# Patient Record
Sex: FEMALE | Race: WHITE | NOT HISPANIC OR LATINO | Employment: OTHER | URBAN - METROPOLITAN AREA
[De-identification: names, ages, dates, MRNs, and addresses within clinical notes are randomized per-mention and may not be internally consistent; named-entity substitution may affect disease eponyms.]

---

## 2020-09-18 PROBLEM — M54.32 BILATERAL SCIATICA: Status: ACTIVE | Noted: 2017-12-08

## 2020-09-18 PROBLEM — R00.0 TACHYARRHYTHMIA: Status: ACTIVE | Noted: 2019-09-17

## 2020-09-18 PROBLEM — M54.31 BILATERAL SCIATICA: Status: ACTIVE | Noted: 2017-12-08

## 2020-09-18 PROBLEM — G47.00 INSOMNIA: Status: ACTIVE | Noted: 2018-12-13

## 2020-12-16 ENCOUNTER — LAB VISIT (OUTPATIENT)
Dept: LAB | Facility: HOSPITAL | Age: 70
End: 2020-12-16
Attending: INTERNAL MEDICINE
Payer: MEDICARE

## 2020-12-16 DIAGNOSIS — I10 ESSENTIAL (PRIMARY) HYPERTENSION: ICD-10-CM

## 2020-12-16 DIAGNOSIS — E78.2 MIXED HYPERLIPIDEMIA: ICD-10-CM

## 2020-12-16 LAB
ALBUMIN SERPL BCP-MCNC: 3.9 G/DL (ref 3.5–5.2)
ALP SERPL-CCNC: 64 U/L (ref 55–135)
ALT SERPL W/O P-5'-P-CCNC: 15 U/L (ref 10–44)
ANION GAP SERPL CALC-SCNC: 9 MMOL/L (ref 8–16)
AST SERPL-CCNC: 17 U/L (ref 10–40)
BASOPHILS # BLD AUTO: 0.05 K/UL (ref 0–0.2)
BASOPHILS NFR BLD: 1.2 % (ref 0–1.9)
BILIRUB SERPL-MCNC: 0.5 MG/DL (ref 0.1–1)
BUN SERPL-MCNC: 15 MG/DL (ref 8–23)
CALCIUM SERPL-MCNC: 9.2 MG/DL (ref 8.7–10.5)
CHLORIDE SERPL-SCNC: 107 MMOL/L (ref 95–110)
CHOLEST SERPL-MCNC: 180 MG/DL (ref 120–199)
CHOLEST/HDLC SERPL: 2.9 {RATIO} (ref 2–5)
CO2 SERPL-SCNC: 26 MMOL/L (ref 23–29)
CREAT SERPL-MCNC: 0.8 MG/DL (ref 0.5–1.4)
DIFFERENTIAL METHOD: ABNORMAL
EOSINOPHIL # BLD AUTO: 0.1 K/UL (ref 0–0.5)
EOSINOPHIL NFR BLD: 2.6 % (ref 0–8)
ERYTHROCYTE [DISTWIDTH] IN BLOOD BY AUTOMATED COUNT: 12.9 % (ref 11.5–14.5)
EST. GFR  (AFRICAN AMERICAN): >60 ML/MIN/1.73 M^2
EST. GFR  (NON AFRICAN AMERICAN): >60 ML/MIN/1.73 M^2
GLUCOSE SERPL-MCNC: 92 MG/DL (ref 70–110)
HCT VFR BLD AUTO: 37.9 % (ref 37–48.5)
HDLC SERPL-MCNC: 62 MG/DL (ref 40–75)
HDLC SERPL: 34.4 % (ref 20–50)
HGB BLD-MCNC: 12.4 G/DL (ref 12–16)
IMM GRANULOCYTES # BLD AUTO: 0.01 K/UL (ref 0–0.04)
IMM GRANULOCYTES NFR BLD AUTO: 0.2 % (ref 0–0.5)
LDLC SERPL CALC-MCNC: 100 MG/DL (ref 63–159)
LYMPHOCYTES # BLD AUTO: 1.7 K/UL (ref 1–4.8)
LYMPHOCYTES NFR BLD: 41.3 % (ref 18–48)
MCH RBC QN AUTO: 30.2 PG (ref 27–31)
MCHC RBC AUTO-ENTMCNC: 32.7 G/DL (ref 32–36)
MCV RBC AUTO: 92 FL (ref 82–98)
MONOCYTES # BLD AUTO: 0.6 K/UL (ref 0.3–1)
MONOCYTES NFR BLD: 13.9 % (ref 4–15)
NEUTROPHILS # BLD AUTO: 1.7 K/UL (ref 1.8–7.7)
NEUTROPHILS NFR BLD: 40.8 % (ref 38–73)
NONHDLC SERPL-MCNC: 118 MG/DL
NRBC BLD-RTO: 0 /100 WBC
PLATELET # BLD AUTO: 248 K/UL (ref 150–350)
PMV BLD AUTO: 9.7 FL (ref 9.2–12.9)
POTASSIUM SERPL-SCNC: 4.2 MMOL/L (ref 3.5–5.1)
PROT SERPL-MCNC: 6.8 G/DL (ref 6–8.4)
RBC # BLD AUTO: 4.1 M/UL (ref 4–5.4)
SODIUM SERPL-SCNC: 142 MMOL/L (ref 136–145)
TRIGL SERPL-MCNC: 90 MG/DL (ref 30–150)
WBC # BLD AUTO: 4.16 K/UL (ref 3.9–12.7)

## 2020-12-16 PROCEDURE — 80061 LIPID PANEL: CPT

## 2020-12-16 PROCEDURE — 36415 COLL VENOUS BLD VENIPUNCTURE: CPT

## 2020-12-16 PROCEDURE — 80053 COMPREHEN METABOLIC PANEL: CPT

## 2020-12-16 PROCEDURE — 85025 COMPLETE CBC W/AUTO DIFF WBC: CPT

## 2020-12-26 ENCOUNTER — OFFICE VISIT (OUTPATIENT)
Dept: URGENT CARE | Facility: CLINIC | Age: 70
End: 2020-12-26
Payer: MEDICARE

## 2020-12-26 VITALS
TEMPERATURE: 99 F | DIASTOLIC BLOOD PRESSURE: 97 MMHG | WEIGHT: 160 LBS | SYSTOLIC BLOOD PRESSURE: 149 MMHG | BODY MASS INDEX: 27.31 KG/M2 | HEIGHT: 64 IN | OXYGEN SATURATION: 98 % | HEART RATE: 85 BPM

## 2020-12-26 DIAGNOSIS — M79.641 RIGHT HAND PAIN: ICD-10-CM

## 2020-12-26 DIAGNOSIS — S63.601A SPRAIN OF RIGHT THUMB, UNSPECIFIED SITE OF DIGIT, INITIAL ENCOUNTER: Primary | ICD-10-CM

## 2020-12-26 PROCEDURE — 99204 PR OFFICE/OUTPT VISIT, NEW, LEVL IV, 45-59 MIN: ICD-10-PCS | Mod: S$GLB,,, | Performed by: NURSE PRACTITIONER

## 2020-12-26 PROCEDURE — 73130 X-RAY EXAM OF HAND: CPT | Mod: FY,RT,S$GLB, | Performed by: RADIOLOGY

## 2020-12-26 PROCEDURE — 73110 XR WRIST COMPLETE 3 VIEWS RIGHT: ICD-10-PCS | Mod: FY,RT,S$GLB, | Performed by: RADIOLOGY

## 2020-12-26 PROCEDURE — 73110 X-RAY EXAM OF WRIST: CPT | Mod: FY,RT,S$GLB, | Performed by: RADIOLOGY

## 2020-12-26 PROCEDURE — 3008F PR BODY MASS INDEX (BMI) DOCUMENTED: ICD-10-PCS | Mod: CPTII,S$GLB,, | Performed by: NURSE PRACTITIONER

## 2020-12-26 PROCEDURE — 99204 OFFICE O/P NEW MOD 45 MIN: CPT | Mod: S$GLB,,, | Performed by: NURSE PRACTITIONER

## 2020-12-26 PROCEDURE — 73130 XR HAND COMPLETE 3 VIEW RIGHT: ICD-10-PCS | Mod: FY,RT,S$GLB, | Performed by: RADIOLOGY

## 2020-12-26 PROCEDURE — 3008F BODY MASS INDEX DOCD: CPT | Mod: CPTII,S$GLB,, | Performed by: NURSE PRACTITIONER

## 2020-12-26 RX ORDER — IBUPROFEN 200 MG
600 TABLET ORAL
Status: COMPLETED | OUTPATIENT
Start: 2020-12-26 | End: 2020-12-26

## 2020-12-26 RX ADMIN — Medication 600 MG: at 01:12

## 2020-12-26 NOTE — PROGRESS NOTES
"Subjective:       Patient ID: Karen Arango is a 70 y.o. female.    Vitals:  height is 5' 4" (1.626 m) and weight is 72.6 kg (160 lb). Her temperature is 98.7 °F (37.1 °C). Her blood pressure is 149/97 (abnormal) and her pulse is 85. Her oxygen saturation is 98%.     Chief Complaint: Hand Injury (THUMB PAIN SWOLLEN)    70-year-old female with complaints of right hand pain/thumb pain after hitting it on the floor while playing with her grandchild yesterday. " I think I jammed it".      Hand Injury   Her dominant hand is their right hand. The incident occurred 12 to 24 hours ago (YESTERDAY). The incident occurred at home. Injury mechanism: SLID AND JAMMED FINGER. The pain is present in the right fingers. The quality of the pain is described as aching (THROBBING). The pain does not radiate. The pain is at a severity of 7/10. The pain is moderate. The pain has been constant since the incident. Pertinent negatives include no chest pain. The symptoms are aggravated by palpation and movement. She has tried nothing for the symptoms. The treatment provided no relief.       Constitution: Negative. Negative for chills, fatigue and fever.   HENT: Negative for congestion and sore throat.    Neck: Negative for painful lymph nodes.   Cardiovascular: Negative for chest pain and leg swelling.   Eyes: Negative for double vision and blurred vision.   Respiratory: Negative for cough and shortness of breath.    Gastrointestinal: Negative for nausea, vomiting and diarrhea.   Genitourinary: Negative for dysuria, frequency, urgency and history of kidney stones.   Musculoskeletal: Positive for joint pain (right thumb) and joint swelling (mild swelling). Negative for muscle cramps and muscle ache.   Skin: Negative for color change, pale, rash and bruising.   Allergic/Immunologic: Negative for seasonal allergies.   Neurological: Negative for dizziness, history of vertigo, light-headedness, passing out and headaches.   Hematologic/Lymphatic: " Negative for swollen lymph nodes.   Psychiatric/Behavioral: Negative for nervous/anxious, sleep disturbance and depression. The patient is not nervous/anxious.        Objective:      Physical Exam   Musculoskeletal:      Right hand: She exhibits normal capillary refill. Decreased sensation is not present in the ulnar distribution, is not present in the medial distribution and is not present in the radial distribution.        Hands:      The following results have been reviewed with the patient:  LABS-     IMAGING-  Xr Wrist Complete 3 Views Right    Result Date: 12/26/2020  EXAMINATION: XR WRIST COMPLETE 3 VIEWS RIGHT CLINICAL HISTORY: Pain in right hand TECHNIQUE: PA, lateral, and oblique views of the right wrist were performed. FINDINGS: There is no fracture, dislocation, or bony erosion.     As above. Electronically signed by: Dann Zelaya MD Date:    12/26/2020 Time:    13:14    Xr Hand Complete 3 View Right    Result Date: 12/26/2020  EXAMINATION: XR HAND COMPLETE 3 VIEW RIGHT CLINICAL HISTORY: Pain in right hand TECHNIQUE: PA, lateral, and oblique views of the right hand were performed. FINDINGS: There is no acute fracture, dislocation, or bony erosion.  There is loss of joint space involving the proximal interphalangeal joint of the 4th digit which appears mildly laterally subluxed.     As above. Electronically signed by: Dann Zelaya MD Date:    12/26/2020 Time:    13:13        Assessment:       1. Sprain of right thumb, unspecified site of digit, initial encounter    2. Right hand pain        Plan:     FOLLOWUP  No follow-ups on file.     PATIENT INSTRUCTIONS  Patient Instructions     Rest, ice, elevation.  Follow up with ortho as discussed.  Wear splint as needed.    Finger Sprain  A sprain is a stretching or tearing of the ligaments that hold a joint together. There are no broken bones. Sprains take 3 to 6 weeks to heal.  A sprained finger may be treated with a splint or buddy tape. This is when you tape  the injured finger to the one next to it for support. Minor sprains may require no additional support.  Home care  · Keep your hand elevated to reduce pain and swelling. This is very important during the first 48 hours.  · Apply an ice pack over the injured area for 15 to 20 minutes every 3 to 6 hours. You should do this for the first 24 to 48 hours. You can make an ice pack by filling a plastic bag that seals at the top with ice cubes and then wrapping it with a thin towel. Continue the use of ice packs for relief of pain and swelling as needed. As the ice melts, be careful to avoid getting any wrap or splint wet. After 48 hours, apply heat (warm shower or warm bath) for 15 to 20 minutes several times a day, or alternate ice and heat.  · If buddy tape was applied and it becomes wet or dirty, change it. You may replace it with paper, plastic or cloth tape. Cloth tape and paper tapes must be kept dry. Apply gauze or cotton padding between the fingers, especially at the webbed space. This will help prevent the skin from getting moist and breaking down. Keep the buddy tape in place for at least 4 weeks, or as instructed by your healthcare provider.  · If a splint was applied, wear it for the time advised.  · You may use over-the-counter pain medicine to control pain, unless another pain medicine was prescribed. If you have chronic liver or kidney disease or ever had a stomach ulcer or GI bleeding, talk with your healthcare provider before using these medicines.  Follow-up care  Follow up with your healthcare provider as directed. Finger joints will become stiff if immobile for too long. If a splint was applied, ask your healthcare provider when it is safe to begin range-of-motion exercises.  Sometimes fractures dont show up on the first X-ray. Bruises and sprains can sometimes hurt as much as a fracture. These injuries can take time to heal completely. If your symptoms dont improve or they get worse, talk with your  healthcare provider. You may need a repeat X-ray. If X-rays were taken, you will be told of any new findings that may affect your care.  When to seek medical advice  Call your healthcare provider right away if any of these occur:  · Pain or swelling increases  · Fingers or hand becomes cold, blue, numb, or tingly  Date Last Reviewed: 11/20/2015  © 2041-0133 [employer]. [address] [place] [address], [place] [address]. All rights reserved. This information is not intended as a substitute for professional medical care. Always follow your healthcare professional's instructions.             THANK YOU FOR ALLOWING ME TO PARTICIPATE IN YOUR HEALTHCARE,     Enrico Currie NP     Sprain of right thumb, unspecified site of digit, initial encounter  -     Ambulatory referral/consult to Orthopedics    Right hand pain  -     XR HAND COMPLETE 3 VIEW RIGHT; Future; Expected date: 12/26/2020  -     XR WRIST COMPLETE 3 VIEWS RIGHT; Future; Expected date: 12/26/2020  -     ibuprofen tablet 600 mg  -     WRIST BRACE FOR HOME USE

## 2020-12-26 NOTE — PATIENT INSTRUCTIONS
Rest, ice, elevation.  Follow up with ortho as discussed.  Wear splint as needed.    Finger Sprain  A sprain is a stretching or tearing of the ligaments that hold a joint together. There are no broken bones. Sprains take 3 to 6 weeks to heal.  A sprained finger may be treated with a splint or buddy tape. This is when you tape the injured finger to the one next to it for support. Minor sprains may require no additional support.  Home care  · Keep your hand elevated to reduce pain and swelling. This is very important during the first 48 hours.  · Apply an ice pack over the injured area for 15 to 20 minutes every 3 to 6 hours. You should do this for the first 24 to 48 hours. You can make an ice pack by filling a plastic bag that seals at the top with ice cubes and then wrapping it with a thin towel. Continue the use of ice packs for relief of pain and swelling as needed. As the ice melts, be careful to avoid getting any wrap or splint wet. After 48 hours, apply heat (warm shower or warm bath) for 15 to 20 minutes several times a day, or alternate ice and heat.  · If buddy tape was applied and it becomes wet or dirty, change it. You may replace it with paper, plastic or cloth tape. Cloth tape and paper tapes must be kept dry. Apply gauze or cotton padding between the fingers, especially at the webbed space. This will help prevent the skin from getting moist and breaking down. Keep the buddy tape in place for at least 4 weeks, or as instructed by your healthcare provider.  · If a splint was applied, wear it for the time advised.  · You may use over-the-counter pain medicine to control pain, unless another pain medicine was prescribed. If you have chronic liver or kidney disease or ever had a stomach ulcer or GI bleeding, talk with your healthcare provider before using these medicines.  Follow-up care  Follow up with your healthcare provider as directed. Finger joints will become stiff if immobile for too long. If a  splint was applied, ask your healthcare provider when it is safe to begin range-of-motion exercises.  Sometimes fractures dont show up on the first X-ray. Bruises and sprains can sometimes hurt as much as a fracture. These injuries can take time to heal completely. If your symptoms dont improve or they get worse, talk with your healthcare provider. You may need a repeat X-ray. If X-rays were taken, you will be told of any new findings that may affect your care.  When to seek medical advice  Call your healthcare provider right away if any of these occur:  · Pain or swelling increases  · Fingers or hand becomes cold, blue, numb, or tingly  Date Last Reviewed: 11/20/2015  © 2390-7459 [employer]. [address] [place] [address], [place] [address]. All rights reserved. This information is not intended as a substitute for professional medical care. Always follow your healthcare professional's instructions.

## 2020-12-28 ENCOUNTER — TELEPHONE (OUTPATIENT)
Dept: URGENT CARE | Facility: CLINIC | Age: 70
End: 2020-12-28

## 2021-03-09 ENCOUNTER — LAB VISIT (OUTPATIENT)
Dept: LAB | Facility: HOSPITAL | Age: 71
End: 2021-03-09
Attending: INTERNAL MEDICINE
Payer: MEDICARE

## 2021-03-09 DIAGNOSIS — M81.0 OSTEOPOROSIS: Primary | ICD-10-CM

## 2021-03-09 DIAGNOSIS — E55.9 VITAMIN D DEFICIENCY, UNSPECIFIED: ICD-10-CM

## 2021-03-09 LAB
25(OH)D3+25(OH)D2 SERPL-MCNC: 56 NG/ML (ref 30–96)
ALBUMIN SERPL BCP-MCNC: 4 G/DL (ref 3.5–5.2)
ALP SERPL-CCNC: 66 U/L (ref 55–135)
ALT SERPL W/O P-5'-P-CCNC: 16 U/L (ref 10–44)
ANION GAP SERPL CALC-SCNC: 8 MMOL/L (ref 8–16)
AST SERPL-CCNC: 16 U/L (ref 10–40)
BASOPHILS # BLD AUTO: 0.04 K/UL (ref 0–0.2)
BASOPHILS NFR BLD: 1.1 % (ref 0–1.9)
BILIRUB SERPL-MCNC: 0.5 MG/DL (ref 0.1–1)
BUN SERPL-MCNC: 12 MG/DL (ref 8–23)
CALCIUM SERPL-MCNC: 9.2 MG/DL (ref 8.7–10.5)
CHLORIDE SERPL-SCNC: 106 MMOL/L (ref 95–110)
CO2 SERPL-SCNC: 29 MMOL/L (ref 23–29)
CREAT SERPL-MCNC: 0.8 MG/DL (ref 0.5–1.4)
DIFFERENTIAL METHOD: ABNORMAL
EOSINOPHIL # BLD AUTO: 0.1 K/UL (ref 0–0.5)
EOSINOPHIL NFR BLD: 2.5 % (ref 0–8)
ERYTHROCYTE [DISTWIDTH] IN BLOOD BY AUTOMATED COUNT: 12.6 % (ref 11.5–14.5)
EST. GFR  (AFRICAN AMERICAN): >60 ML/MIN/1.73 M^2
EST. GFR  (NON AFRICAN AMERICAN): >60 ML/MIN/1.73 M^2
GLUCOSE SERPL-MCNC: 109 MG/DL (ref 70–110)
HCT VFR BLD AUTO: 39.1 % (ref 37–48.5)
HGB BLD-MCNC: 12.7 G/DL (ref 12–16)
IMM GRANULOCYTES # BLD AUTO: 0.01 K/UL (ref 0–0.04)
IMM GRANULOCYTES NFR BLD AUTO: 0.3 % (ref 0–0.5)
LYMPHOCYTES # BLD AUTO: 1.4 K/UL (ref 1–4.8)
LYMPHOCYTES NFR BLD: 38.5 % (ref 18–48)
MAGNESIUM SERPL-MCNC: 2.1 MG/DL (ref 1.6–2.6)
MCH RBC QN AUTO: 30.2 PG (ref 27–31)
MCHC RBC AUTO-ENTMCNC: 32.5 G/DL (ref 32–36)
MCV RBC AUTO: 93 FL (ref 82–98)
MONOCYTES # BLD AUTO: 0.5 K/UL (ref 0.3–1)
MONOCYTES NFR BLD: 14.8 % (ref 4–15)
NEUTROPHILS # BLD AUTO: 1.6 K/UL (ref 1.8–7.7)
NEUTROPHILS NFR BLD: 42.8 % (ref 38–73)
NRBC BLD-RTO: 0 /100 WBC
PHOSPHATE SERPL-MCNC: 3.2 MG/DL (ref 2.7–4.5)
PLATELET # BLD AUTO: 230 K/UL (ref 150–350)
PMV BLD AUTO: 9.6 FL (ref 9.2–12.9)
POTASSIUM SERPL-SCNC: 3.9 MMOL/L (ref 3.5–5.1)
PROT SERPL-MCNC: 7 G/DL (ref 6–8.4)
PTH-INTACT SERPL-MCNC: 46 PG/ML (ref 9–77)
RBC # BLD AUTO: 4.21 M/UL (ref 4–5.4)
SODIUM SERPL-SCNC: 143 MMOL/L (ref 136–145)
URATE SERPL-MCNC: 5.5 MG/DL (ref 2.4–5.7)
WBC # BLD AUTO: 3.66 K/UL (ref 3.9–12.7)

## 2021-03-09 PROCEDURE — 84100 ASSAY OF PHOSPHORUS: CPT | Performed by: INTERNAL MEDICINE

## 2021-03-09 PROCEDURE — 84550 ASSAY OF BLOOD/URIC ACID: CPT | Performed by: INTERNAL MEDICINE

## 2021-03-09 PROCEDURE — 36415 COLL VENOUS BLD VENIPUNCTURE: CPT | Performed by: INTERNAL MEDICINE

## 2021-03-09 PROCEDURE — 83519 RIA NONANTIBODY: CPT | Performed by: INTERNAL MEDICINE

## 2021-03-09 PROCEDURE — 80053 COMPREHEN METABOLIC PANEL: CPT | Performed by: INTERNAL MEDICINE

## 2021-03-09 PROCEDURE — 82523 COLLAGEN CROSSLINKS: CPT | Performed by: INTERNAL MEDICINE

## 2021-03-09 PROCEDURE — 83735 ASSAY OF MAGNESIUM: CPT | Performed by: INTERNAL MEDICINE

## 2021-03-09 PROCEDURE — 85025 COMPLETE CBC W/AUTO DIFF WBC: CPT | Performed by: INTERNAL MEDICINE

## 2021-03-09 PROCEDURE — 82306 VITAMIN D 25 HYDROXY: CPT | Performed by: INTERNAL MEDICINE

## 2021-03-09 PROCEDURE — 83970 ASSAY OF PARATHORMONE: CPT | Performed by: INTERNAL MEDICINE

## 2021-03-10 LAB — COLLAGEN CTX SERPL-MCNC: 319 PG/ML

## 2021-03-11 LAB — PROCOLLAGEN TYPE 1 PROPEPTIDE: 49 MCG/L

## 2021-09-08 ENCOUNTER — TELEPHONE (OUTPATIENT)
Dept: FAMILY MEDICINE | Facility: CLINIC | Age: 71
End: 2021-09-08

## 2021-09-08 DIAGNOSIS — U07.1 COVID-19 VIRUS INFECTION: Primary | ICD-10-CM

## 2021-09-09 ENCOUNTER — INFUSION (OUTPATIENT)
Dept: INFECTIOUS DISEASES | Facility: HOSPITAL | Age: 71
End: 2021-09-09
Attending: INTERNAL MEDICINE
Payer: MEDICARE

## 2021-09-09 ENCOUNTER — NURSE TRIAGE (OUTPATIENT)
Dept: ADMINISTRATIVE | Facility: CLINIC | Age: 71
End: 2021-09-09

## 2021-09-09 VITALS
BODY MASS INDEX: 28.35 KG/M2 | HEIGHT: 63 IN | WEIGHT: 160 LBS | HEART RATE: 67 BPM | RESPIRATION RATE: 20 BRPM | OXYGEN SATURATION: 97 % | SYSTOLIC BLOOD PRESSURE: 143 MMHG | DIASTOLIC BLOOD PRESSURE: 77 MMHG | TEMPERATURE: 99 F

## 2021-09-09 DIAGNOSIS — U07.1 COVID-19: Primary | ICD-10-CM

## 2021-09-09 PROCEDURE — 25000003 PHARM REV CODE 250: Performed by: INTERNAL MEDICINE

## 2021-09-09 PROCEDURE — 63600175 PHARM REV CODE 636 W HCPCS: Performed by: INTERNAL MEDICINE

## 2021-09-09 PROCEDURE — M0243 CASIRIVI AND IMDEVI INFUSION: HCPCS | Performed by: INTERNAL MEDICINE

## 2021-09-09 RX ORDER — EPINEPHRINE 0.3 MG/.3ML
0.3 INJECTION SUBCUTANEOUS
Status: DISCONTINUED | OUTPATIENT
Start: 2021-09-09 | End: 2021-12-09

## 2021-09-09 RX ORDER — SODIUM CHLORIDE 0.9 % (FLUSH) 0.9 %
10 SYRINGE (ML) INJECTION
Status: DISCONTINUED | OUTPATIENT
Start: 2021-09-09 | End: 2021-12-09

## 2021-09-09 RX ORDER — DIPHENHYDRAMINE HYDROCHLORIDE 50 MG/ML
25 INJECTION INTRAMUSCULAR; INTRAVENOUS ONCE AS NEEDED
Status: DISCONTINUED | OUTPATIENT
Start: 2021-09-09 | End: 2021-12-09

## 2021-09-09 RX ORDER — ONDANSETRON 4 MG/1
4 TABLET, ORALLY DISINTEGRATING ORAL ONCE AS NEEDED
Status: DISCONTINUED | OUTPATIENT
Start: 2021-09-09 | End: 2021-12-09

## 2021-09-09 RX ORDER — ACETAMINOPHEN 325 MG/1
650 TABLET ORAL ONCE AS NEEDED
Status: DISCONTINUED | OUTPATIENT
Start: 2021-09-09 | End: 2021-12-09

## 2021-09-09 RX ORDER — ALBUTEROL SULFATE 90 UG/1
2 AEROSOL, METERED RESPIRATORY (INHALATION)
Status: DISCONTINUED | OUTPATIENT
Start: 2021-09-09 | End: 2021-12-09

## 2021-09-09 RX ADMIN — CASIRIVIMAB AND IMDEVIMAB 600 MG: 600; 600 INJECTION, SOLUTION, CONCENTRATE INTRAVENOUS at 01:09

## 2021-09-10 PROBLEM — F43.20 ADJUSTMENT DISORDER: Status: ACTIVE | Noted: 2021-03-22

## 2021-12-07 ENCOUNTER — LAB VISIT (OUTPATIENT)
Dept: LAB | Facility: HOSPITAL | Age: 71
End: 2021-12-07
Attending: INTERNAL MEDICINE
Payer: MEDICARE

## 2021-12-07 DIAGNOSIS — I10 ESSENTIAL (PRIMARY) HYPERTENSION: ICD-10-CM

## 2021-12-07 LAB
ALBUMIN SERPL BCP-MCNC: 3.8 G/DL (ref 3.5–5.2)
ALP SERPL-CCNC: 65 U/L (ref 55–135)
ALT SERPL W/O P-5'-P-CCNC: 18 U/L (ref 10–44)
ANION GAP SERPL CALC-SCNC: 8 MMOL/L (ref 8–16)
AST SERPL-CCNC: 17 U/L (ref 10–40)
BASOPHILS # BLD AUTO: 0.04 K/UL (ref 0–0.2)
BASOPHILS NFR BLD: 1.1 % (ref 0–1.9)
BILIRUB SERPL-MCNC: 0.5 MG/DL (ref 0.1–1)
BUN SERPL-MCNC: 12 MG/DL (ref 8–23)
CALCIUM SERPL-MCNC: 9.2 MG/DL (ref 8.7–10.5)
CHLORIDE SERPL-SCNC: 108 MMOL/L (ref 95–110)
CHOLEST SERPL-MCNC: 204 MG/DL (ref 120–199)
CHOLEST/HDLC SERPL: 3.3 {RATIO} (ref 2–5)
CO2 SERPL-SCNC: 28 MMOL/L (ref 23–29)
CREAT SERPL-MCNC: 0.8 MG/DL (ref 0.5–1.4)
DIFFERENTIAL METHOD: ABNORMAL
EOSINOPHIL # BLD AUTO: 0.1 K/UL (ref 0–0.5)
EOSINOPHIL NFR BLD: 2.5 % (ref 0–8)
ERYTHROCYTE [DISTWIDTH] IN BLOOD BY AUTOMATED COUNT: 13 % (ref 11.5–14.5)
EST. GFR  (AFRICAN AMERICAN): >60 ML/MIN/1.73 M^2
EST. GFR  (NON AFRICAN AMERICAN): >60 ML/MIN/1.73 M^2
GLUCOSE SERPL-MCNC: 104 MG/DL (ref 70–110)
HCT VFR BLD AUTO: 38.9 % (ref 37–48.5)
HDLC SERPL-MCNC: 61 MG/DL (ref 40–75)
HDLC SERPL: 29.9 % (ref 20–50)
HGB BLD-MCNC: 12.8 G/DL (ref 12–16)
IMM GRANULOCYTES # BLD AUTO: 0 K/UL (ref 0–0.04)
IMM GRANULOCYTES NFR BLD AUTO: 0 % (ref 0–0.5)
LDLC SERPL CALC-MCNC: 118.2 MG/DL (ref 63–159)
LYMPHOCYTES # BLD AUTO: 1.5 K/UL (ref 1–4.8)
LYMPHOCYTES NFR BLD: 42.2 % (ref 18–48)
MCH RBC QN AUTO: 30.3 PG (ref 27–31)
MCHC RBC AUTO-ENTMCNC: 32.9 G/DL (ref 32–36)
MCV RBC AUTO: 92 FL (ref 82–98)
MONOCYTES # BLD AUTO: 0.5 K/UL (ref 0.3–1)
MONOCYTES NFR BLD: 13.4 % (ref 4–15)
NEUTROPHILS # BLD AUTO: 1.5 K/UL (ref 1.8–7.7)
NEUTROPHILS NFR BLD: 40.8 % (ref 38–73)
NONHDLC SERPL-MCNC: 143 MG/DL
NRBC BLD-RTO: 0 /100 WBC
PLATELET # BLD AUTO: 220 K/UL (ref 150–450)
PMV BLD AUTO: 9.8 FL (ref 9.2–12.9)
POTASSIUM SERPL-SCNC: 4.3 MMOL/L (ref 3.5–5.1)
PROT SERPL-MCNC: 6.4 G/DL (ref 6–8.4)
RBC # BLD AUTO: 4.22 M/UL (ref 4–5.4)
SODIUM SERPL-SCNC: 144 MMOL/L (ref 136–145)
TRIGL SERPL-MCNC: 124 MG/DL (ref 30–150)
WBC # BLD AUTO: 3.65 K/UL (ref 3.9–12.7)

## 2021-12-07 PROCEDURE — 80061 LIPID PANEL: CPT | Performed by: INTERNAL MEDICINE

## 2021-12-07 PROCEDURE — 36415 COLL VENOUS BLD VENIPUNCTURE: CPT | Performed by: INTERNAL MEDICINE

## 2021-12-07 PROCEDURE — 80053 COMPREHEN METABOLIC PANEL: CPT | Performed by: INTERNAL MEDICINE

## 2021-12-07 PROCEDURE — 85025 COMPLETE CBC W/AUTO DIFF WBC: CPT | Performed by: INTERNAL MEDICINE

## 2021-12-09 PROBLEM — R00.0 TACHYARRHYTHMIA: Status: RESOLVED | Noted: 2019-09-17 | Resolved: 2021-12-09

## 2021-12-27 ENCOUNTER — TELEPHONE (OUTPATIENT)
Dept: OTOLARYNGOLOGY | Facility: CLINIC | Age: 71
End: 2021-12-27
Payer: MEDICARE

## 2022-02-16 ENCOUNTER — OFFICE VISIT (OUTPATIENT)
Dept: OTOLARYNGOLOGY | Facility: CLINIC | Age: 72
End: 2022-02-16
Payer: MEDICARE

## 2022-02-16 VITALS
HEIGHT: 63 IN | BODY MASS INDEX: 29.92 KG/M2 | SYSTOLIC BLOOD PRESSURE: 140 MMHG | WEIGHT: 168.88 LBS | HEART RATE: 79 BPM | DIASTOLIC BLOOD PRESSURE: 90 MMHG

## 2022-02-16 DIAGNOSIS — J34.89 RHINORRHEA: ICD-10-CM

## 2022-02-16 DIAGNOSIS — J30.89 NON-SEASONAL ALLERGIC RHINITIS DUE TO OTHER ALLERGIC TRIGGER: Chronic | ICD-10-CM

## 2022-02-16 DIAGNOSIS — J30.1 SEASONAL ALLERGIC RHINITIS DUE TO POLLEN: Primary | Chronic | ICD-10-CM

## 2022-02-16 PROCEDURE — 1101F PR PT FALLS ASSESS DOC 0-1 FALLS W/OUT INJ PAST YR: ICD-10-PCS | Mod: CPTII,S$GLB,, | Performed by: OTOLARYNGOLOGY

## 2022-02-16 PROCEDURE — 99999 PR PBB SHADOW E&M-EST. PATIENT-LVL III: CPT | Mod: PBBFAC,,, | Performed by: OTOLARYNGOLOGY

## 2022-02-16 PROCEDURE — 1159F PR MEDICATION LIST DOCUMENTED IN MEDICAL RECORD: ICD-10-PCS | Mod: CPTII,S$GLB,, | Performed by: OTOLARYNGOLOGY

## 2022-02-16 PROCEDURE — 1126F AMNT PAIN NOTED NONE PRSNT: CPT | Mod: CPTII,S$GLB,, | Performed by: OTOLARYNGOLOGY

## 2022-02-16 PROCEDURE — 3077F SYST BP >= 140 MM HG: CPT | Mod: CPTII,S$GLB,, | Performed by: OTOLARYNGOLOGY

## 2022-02-16 PROCEDURE — 3008F PR BODY MASS INDEX (BMI) DOCUMENTED: ICD-10-PCS | Mod: CPTII,S$GLB,, | Performed by: OTOLARYNGOLOGY

## 2022-02-16 PROCEDURE — 1160F RVW MEDS BY RX/DR IN RCRD: CPT | Mod: CPTII,S$GLB,, | Performed by: OTOLARYNGOLOGY

## 2022-02-16 PROCEDURE — 3080F PR MOST RECENT DIASTOLIC BLOOD PRESSURE >= 90 MM HG: ICD-10-PCS | Mod: CPTII,S$GLB,, | Performed by: OTOLARYNGOLOGY

## 2022-02-16 PROCEDURE — 99214 PR OFFICE/OUTPT VISIT, EST, LEVL IV, 30-39 MIN: ICD-10-PCS | Mod: S$GLB,,, | Performed by: OTOLARYNGOLOGY

## 2022-02-16 PROCEDURE — 99214 OFFICE O/P EST MOD 30 MIN: CPT | Mod: S$GLB,,, | Performed by: OTOLARYNGOLOGY

## 2022-02-16 PROCEDURE — 3080F DIAST BP >= 90 MM HG: CPT | Mod: CPTII,S$GLB,, | Performed by: OTOLARYNGOLOGY

## 2022-02-16 PROCEDURE — 1126F PR PAIN SEVERITY QUANTIFIED, NO PAIN PRESENT: ICD-10-PCS | Mod: CPTII,S$GLB,, | Performed by: OTOLARYNGOLOGY

## 2022-02-16 PROCEDURE — 3288F PR FALLS RISK ASSESSMENT DOCUMENTED: ICD-10-PCS | Mod: CPTII,S$GLB,, | Performed by: OTOLARYNGOLOGY

## 2022-02-16 PROCEDURE — 1160F PR REVIEW ALL MEDS BY PRESCRIBER/CLIN PHARMACIST DOCUMENTED: ICD-10-PCS | Mod: CPTII,S$GLB,, | Performed by: OTOLARYNGOLOGY

## 2022-02-16 PROCEDURE — 3077F PR MOST RECENT SYSTOLIC BLOOD PRESSURE >= 140 MM HG: ICD-10-PCS | Mod: CPTII,S$GLB,, | Performed by: OTOLARYNGOLOGY

## 2022-02-16 PROCEDURE — 3008F BODY MASS INDEX DOCD: CPT | Mod: CPTII,S$GLB,, | Performed by: OTOLARYNGOLOGY

## 2022-02-16 PROCEDURE — 1159F MED LIST DOCD IN RCRD: CPT | Mod: CPTII,S$GLB,, | Performed by: OTOLARYNGOLOGY

## 2022-02-16 PROCEDURE — 99999 PR PBB SHADOW E&M-EST. PATIENT-LVL III: ICD-10-PCS | Mod: PBBFAC,,, | Performed by: OTOLARYNGOLOGY

## 2022-02-16 PROCEDURE — 1101F PT FALLS ASSESS-DOCD LE1/YR: CPT | Mod: CPTII,S$GLB,, | Performed by: OTOLARYNGOLOGY

## 2022-02-16 PROCEDURE — 3288F FALL RISK ASSESSMENT DOCD: CPT | Mod: CPTII,S$GLB,, | Performed by: OTOLARYNGOLOGY

## 2022-02-16 RX ORDER — FLUTICASONE PROPIONATE 50 MCG
1 SPRAY, SUSPENSION (ML) NASAL 2 TIMES DAILY
Qty: 11.1 ML | Refills: 11 | Status: SHIPPED | OUTPATIENT
Start: 2022-02-16 | End: 2022-03-18

## 2022-02-16 RX ORDER — AZELASTINE 1 MG/ML
1 SPRAY, METERED NASAL 2 TIMES DAILY
Qty: 30 ML | Refills: 11 | Status: SHIPPED | OUTPATIENT
Start: 2022-02-16 | End: 2023-03-03 | Stop reason: SDUPTHER

## 2022-02-16 RX ORDER — LOTEPREDNOL ETABONATE 3.8 MG/G
1 GEL OPHTHALMIC 3 TIMES DAILY
COMMUNITY
Start: 2021-09-20

## 2022-02-16 NOTE — PATIENT INSTRUCTIONS
The patient was given a prescription for a nasal steroid and/or nasal antihistamine nasal spray and we discussed in detail the proper mechanism of use directing the spray away from the nasal septum.  The patient was also instructed to take a daily oral antihistamine (Claritin, Zyrtec, Allegra, or Xyzal).    In addition, we also discussed that it will take 3-4 weeks of daily use to achieve maximal effectiveness.  The patient will please call in 3-4 weeks with their progress.  If allergy symptoms persist at that time, we could consider additional medications and possibly allergy testing.    How do you use a Nasal Corticosteroid Spray?    Make sure you understand your dosing instructions. Spray only the number of prescribed sprays in each nostril. Read the package instructions before using your spray the first time.    Most corticosteroid sprays suggest the following steps:    Wash your hands well.    Gently blow your nose to clear the passageway.    Shake the container several times.    Tilt your head slightly downward.  Use the opposite hand from the nostril you will be spraying to hold the spray bottle.    Block one nostril with your finger.  Insert the nasal applicator into the other nostril.    Aim the spray toward the outer wall of the nostril.  Inhale slowly through the nose and press the .    Breathe out and repeat to apply the prescribed number of sprays.  Repeat these steps for the other nostril.     Avoid sneezing or blowing your nose right after spraying.

## 2022-02-16 NOTE — PROGRESS NOTES
Chief Complaint   Patient presents with    Allergies        HPI: Karen Arango is here for evaluation of possible allergic rhinitis. Patient's symptoms include clear rhinorrhea, nasal congestion, postnasal drip, pressure sensation in ears, sinus pressure, wheezing and pulmonary symptoms when she has allergy flares. These symptoms are perennial with seasonal exacerbation. Current triggers include exposure to pollens and fumes/strong odors. The patient has been suffering from these symptoms for approximately many years. The patient has tried over the counter medications and prescription nasal sprays with good relief of symptoms. Immunotherapy has been used with good relief of symptoms. The patient has never had nasal polyps. The patient has a history of asthma. The patient does not suffer from frequent sinopulmonary infections. The patient has not had sinus surgery in the past. The patient has no history of eczema.  The she uses nasal saline and takes Zyrtec p.r.n..  She has not been using any allergy medications consistently, but reports that she has not had any recent flare ups with the allergies.  With previous allergy testing she notes that she had sensitivities to grass, trees, auris root/perfumes, and cigarette smoke.  She does not recall other sensitivities besides those.  She was a former patient of angel and Dr. Felix at my old practice.    Past Medical History:   Diagnosis Date    Essential (primary) hypertension 10/27/2015    Gastro-esophageal reflux disease without esophagitis 10/27/2015    Mixed hyperlipidemia 10/27/2015    Tachyarrhythmia 9/17/2019     Social History     Socioeconomic History    Marital status:    Tobacco Use    Smoking status: Never Smoker    Smokeless tobacco: Former User     History reviewed. No pertinent surgical history.  History reviewed. No pertinent family history.        Review of Systems  General: negative for chills, fever or weight loss  Psychological:  negative for mood changes or depression  Ophthalmic: negative for blurry vision, photophobia or eye pain  ENT: see HPI  Respiratory: no cough, shortness of breath, or wheezing  Cardiovascular: no chest pain or dyspnea on exertion  Gastrointestinal: no abdominal pain, change in bowel habits, or black/ bloody stools  Musculoskeletal: negative for gait disturbance or muscular weakness  Neurological: no syncope or seizures; no ataxia  Dermatological: negative for pruritis,  rash and jaundice  Hematologic/lymphatic: no easy bruising, no new adenopathy      Physical Exam:    Vitals:    02/16/22 1304   BP: (!) 140/90   Pulse: 79       Constitutional: Well appearing / communicating without difficutly.  NAD.  Eyes: EOM I Bilaterally  Head/Face: Normocephalic.  Negative paranasal sinus pressure/tenderness.  Salivary glands WNL.  House Brackmann I Bilaterally.    Right Ear: Auricle normal appearance. External Auditory Canal within normal limits no lesions or masses,TM w/o masses/lesions/perforations. TM mobility noted.   Left Ear: Auricle normal appearance. External Auditory Canal within normal limits no lesions or masses,TM w/o masses/lesions/perforations. TM mobility noted.  Nose: No gross nasal septal deviation. Boggy Inferior Turbinates bilaterally with edematous mucosa bilaterally. No septal perforation. No masses/lesions. External nasal skin appears normal without masses/lesions.  Oral Cavity: Gingiva/lips within normal limits.  Dentition/gingiva healthy appearing. Mucus membranes moist. Floor of mouth soft, no masses palpated. Oral Tongue mobile. Hard Palate appears normal.    Oropharynx: Base of tongue appears normal. No masses/lesions noted. Tonsillar fossa/pharyngeal wall without lesions. Posterior oropharynx WNL.  Soft palate without masses. Midline uvula.   Neck/Lymphatic: No LAD I-VI bilaterally.  No thyromegaly.  No masses noted on exam.    Mirror laryngoscopy/nasopharyngoscopy: Active gag reflex.  Unable to  perform.    Neuro/Psychiatric: AOx3.  Normal mood and affect.   Cardiovascular: Normal carotid pulses bilaterally, no increasing jugular venous distention noted at cervical region bilaterally.    Respiratory: Normal respiratory effort, no stridor, no retractions noted.            Assessment:    ICD-10-CM ICD-9-CM    1. Seasonal allergic rhinitis due to pollen  J30.1 477.0 azelastine (ASTELIN) 137 mcg (0.1 %) nasal spray      fluticasone propionate (FLONASE) 50 mcg/actuation nasal spray   2. Non-seasonal allergic rhinitis due to other allergic trigger  J30.89 477.8    3. Rhinorrhea  J34.89 478.19      The primary encounter diagnosis was Seasonal allergic rhinitis due to pollen. Diagnoses of Non-seasonal allergic rhinitis due to other allergic trigger and Rhinorrhea were also pertinent to this visit.      Plan:  No orders of the defined types were placed in this encounter.        The patient was given a prescription for a steroid nasal spray, antihistamine nasal spray, and oral antihistamine therapy, and we discussed in detail the proper mechanism of use directing the nasal spray away from the nasal septum.  In addition, we also discussed that it will take two to three weeks of daily consistent use to achieve maximal effectiveness.  The patient will please call in 2-3 weeks with their progress.  If allergy symptoms persist at that time, we may consider additional allergy testing.  She may continue doing her nasal saline as needed as well.  Patient will have follow-up with me in 6 months for routine checkup, but if treatment isn't working or she has new issues before then, I will see her sooner.    Terrie Stringer MD

## 2022-08-09 ENCOUNTER — OFFICE VISIT (OUTPATIENT)
Dept: OTOLARYNGOLOGY | Facility: CLINIC | Age: 72
End: 2022-08-09
Payer: MEDICARE

## 2022-08-09 VITALS
HEART RATE: 78 BPM | BODY MASS INDEX: 30.32 KG/M2 | SYSTOLIC BLOOD PRESSURE: 129 MMHG | DIASTOLIC BLOOD PRESSURE: 82 MMHG | WEIGHT: 171.19 LBS

## 2022-08-09 DIAGNOSIS — J34.89 RHINORRHEA: Chronic | ICD-10-CM

## 2022-08-09 DIAGNOSIS — J30.1 SEASONAL ALLERGIC RHINITIS DUE TO POLLEN: Primary | Chronic | ICD-10-CM

## 2022-08-09 DIAGNOSIS — J30.89 NON-SEASONAL ALLERGIC RHINITIS DUE TO OTHER ALLERGIC TRIGGER: Chronic | ICD-10-CM

## 2022-08-09 DIAGNOSIS — J01.00 ACUTE NON-RECURRENT MAXILLARY SINUSITIS: Chronic | ICD-10-CM

## 2022-08-09 PROCEDURE — 3074F SYST BP LT 130 MM HG: CPT | Mod: CPTII,S$GLB,, | Performed by: OTOLARYNGOLOGY

## 2022-08-09 PROCEDURE — 99214 PR OFFICE/OUTPT VISIT, EST, LEVL IV, 30-39 MIN: ICD-10-PCS | Mod: S$GLB,,, | Performed by: OTOLARYNGOLOGY

## 2022-08-09 PROCEDURE — 3079F PR MOST RECENT DIASTOLIC BLOOD PRESSURE 80-89 MM HG: ICD-10-PCS | Mod: CPTII,S$GLB,, | Performed by: OTOLARYNGOLOGY

## 2022-08-09 PROCEDURE — 1160F RVW MEDS BY RX/DR IN RCRD: CPT | Mod: CPTII,S$GLB,, | Performed by: OTOLARYNGOLOGY

## 2022-08-09 PROCEDURE — 3288F PR FALLS RISK ASSESSMENT DOCUMENTED: ICD-10-PCS | Mod: CPTII,S$GLB,, | Performed by: OTOLARYNGOLOGY

## 2022-08-09 PROCEDURE — 1101F PR PT FALLS ASSESS DOC 0-1 FALLS W/OUT INJ PAST YR: ICD-10-PCS | Mod: CPTII,S$GLB,, | Performed by: OTOLARYNGOLOGY

## 2022-08-09 PROCEDURE — 1126F AMNT PAIN NOTED NONE PRSNT: CPT | Mod: CPTII,S$GLB,, | Performed by: OTOLARYNGOLOGY

## 2022-08-09 PROCEDURE — 3074F PR MOST RECENT SYSTOLIC BLOOD PRESSURE < 130 MM HG: ICD-10-PCS | Mod: CPTII,S$GLB,, | Performed by: OTOLARYNGOLOGY

## 2022-08-09 PROCEDURE — 1160F PR REVIEW ALL MEDS BY PRESCRIBER/CLIN PHARMACIST DOCUMENTED: ICD-10-PCS | Mod: CPTII,S$GLB,, | Performed by: OTOLARYNGOLOGY

## 2022-08-09 PROCEDURE — 3008F PR BODY MASS INDEX (BMI) DOCUMENTED: ICD-10-PCS | Mod: CPTII,S$GLB,, | Performed by: OTOLARYNGOLOGY

## 2022-08-09 PROCEDURE — 1101F PT FALLS ASSESS-DOCD LE1/YR: CPT | Mod: CPTII,S$GLB,, | Performed by: OTOLARYNGOLOGY

## 2022-08-09 PROCEDURE — 1159F PR MEDICATION LIST DOCUMENTED IN MEDICAL RECORD: ICD-10-PCS | Mod: CPTII,S$GLB,, | Performed by: OTOLARYNGOLOGY

## 2022-08-09 PROCEDURE — 99214 OFFICE O/P EST MOD 30 MIN: CPT | Mod: S$GLB,,, | Performed by: OTOLARYNGOLOGY

## 2022-08-09 PROCEDURE — 1126F PR PAIN SEVERITY QUANTIFIED, NO PAIN PRESENT: ICD-10-PCS | Mod: CPTII,S$GLB,, | Performed by: OTOLARYNGOLOGY

## 2022-08-09 PROCEDURE — 3008F BODY MASS INDEX DOCD: CPT | Mod: CPTII,S$GLB,, | Performed by: OTOLARYNGOLOGY

## 2022-08-09 PROCEDURE — 1159F MED LIST DOCD IN RCRD: CPT | Mod: CPTII,S$GLB,, | Performed by: OTOLARYNGOLOGY

## 2022-08-09 PROCEDURE — 3288F FALL RISK ASSESSMENT DOCD: CPT | Mod: CPTII,S$GLB,, | Performed by: OTOLARYNGOLOGY

## 2022-08-09 PROCEDURE — 99999 PR PBB SHADOW E&M-EST. PATIENT-LVL III: ICD-10-PCS | Mod: PBBFAC,,, | Performed by: OTOLARYNGOLOGY

## 2022-08-09 PROCEDURE — 99999 PR PBB SHADOW E&M-EST. PATIENT-LVL III: CPT | Mod: PBBFAC,,, | Performed by: OTOLARYNGOLOGY

## 2022-08-09 PROCEDURE — 3079F DIAST BP 80-89 MM HG: CPT | Mod: CPTII,S$GLB,, | Performed by: OTOLARYNGOLOGY

## 2022-08-09 RX ORDER — LEVOCETIRIZINE DIHYDROCHLORIDE 5 MG/1
5 TABLET, FILM COATED ORAL NIGHTLY
Qty: 30 TABLET | Refills: 11 | Status: SHIPPED | OUTPATIENT
Start: 2022-08-09 | End: 2022-09-19

## 2022-08-09 RX ORDER — AMOXICILLIN 500 MG/1
500 TABLET, FILM COATED ORAL EVERY 12 HOURS
Qty: 20 TABLET | Refills: 0 | Status: SHIPPED | OUTPATIENT
Start: 2022-08-09 | End: 2022-08-19

## 2022-08-09 NOTE — PATIENT INSTRUCTIONS
Continue nasal sprays and zyrtec.  May try xyzal if desired, Rx given.     If symptoms of sinusitis recur/persist, let me know and may take Rx for Amoxicillin.     Get Gideon Med Sinus Rinse from the pharmacy.  Use it daily according to the instructions.  It will help to cut down the inflammation and mucus/crusting in your nose and sinuses.

## 2022-08-09 NOTE — PROGRESS NOTES
Chief Complaint   Patient presents with    Follow-up     Allergies        HPI: Karen Arango is here for evaluation of possible allergic rhinitis. Patient's symptoms include clear rhinorrhea, nasal congestion, postnasal drip, pressure sensation in ears, sinus pressure, wheezing and pulmonary symptoms when she has allergy flares. These symptoms are perennial with seasonal exacerbation. Current triggers include exposure to pollens and fumes/strong odors. The patient has been suffering from these symptoms for approximately many years. The patient has tried over the counter medications and prescription nasal sprays with good relief of symptoms. Immunotherapy has been used with good relief of symptoms. The patient has never had nasal polyps. The patient has a history of asthma. The patient does not suffer from frequent sinopulmonary infections. The patient has not had sinus surgery in the past. The patient has no history of eczema.  The she uses nasal saline and takes Zyrtec p.r.n..  She has not been using any allergy medications consistently, but reports that she has not had any recent flare ups with the allergies.  With previous allergy testing she notes that she had sensitivities to grass, trees, auris root/perfumes, and cigarette smoke.  She does not recall other sensitivities besides those.  She was a former patient of [facility] and Dr. Jacksons at my old practice.    Interval HPI: Patient overall has been doing well.  She did have a URI and bout of sinusitis following that in the last few months, but overall she does feel her AR symptoms have been better controlled on the medication.       Active Ambulatory Problems     Diagnosis Date Noted    Bilateral sciatica 12/08/2017    Essential (primary) hypertension 10/27/2015    Gastro-esophageal reflux disease without esophagitis 10/27/2015    Heart murmur 06/07/2016    Insomnia 12/13/2018    Mixed hyperlipidemia 10/27/2015    Adjustment disorder 03/22/2021     Resolved  Ambulatory Problems     Diagnosis Date Noted    Tachyarrhythmia 09/17/2019     No Additional Past Medical History          History reviewed. No pertinent surgical history.  History reviewed. No pertinent family history.           Review of Systems  General: negative for chills, fever or weight loss  Psychological: negative for mood changes or depression  Ophthalmic: negative for blurry vision, photophobia or eye pain  ENT: see HPI  Respiratory: no cough, shortness of breath, or wheezing  Cardiovascular: no chest pain or dyspnea on exertion  Gastrointestinal: no abdominal pain, change in bowel habits, or black/ bloody stools  Musculoskeletal: negative for gait disturbance or muscular weakness  Neurological: no syncope or seizures; no ataxia  Dermatological: negative for pruritis,  rash and jaundice  Hematologic/lymphatic: no easy bruising, no new adenopathy        Physical Exam:           Vitals:    08/09/22 1019   BP: 129/82   Pulse: 78          Constitutional: Well appearing / communicating without difficutly.  NAD.  Eyes: EOM I Bilaterally  Head/Face: Normocephalic. + left maxillary paranasal sinus pressure/tenderness.  Salivary glands WNL.  House Brackmann I Bilaterally.     Right Ear: Auricle normal appearance. External Auditory Canal within normal limits no lesions or masses,TM w/o masses/lesions/perforations. TM mobility noted.   Left Ear: Auricle normal appearance. External Auditory Canal within normal limits no lesions or masses,TM w/o masses/lesions/perforations. TM mobility noted.  Nose: No gross nasal septal deviation. Boggy Inferior Turbinates bilaterally with edematous mucosa bilaterally. No septal perforation. No masses/lesions. External nasal skin appears normal without masses/lesions.  Oral Cavity: Gingiva/lips within normal limits.  Dentition/gingiva healthy appearing. Mucus membranes moist. Floor of mouth soft, no masses palpated. Oral Tongue mobile. Hard Palate appears normal.    Oropharynx: Base  of tongue appears normal. No masses/lesions noted. Tonsillar fossa/pharyngeal wall without lesions. Posterior oropharynx WNL.  Soft palate without masses. Midline uvula.   Neck/Lymphatic: No LAD I-VI bilaterally.  No thyromegaly.  No masses noted on exam.     Mirror laryngoscopy/nasopharyngoscopy: Active gag reflex.  Unable to perform.     Neuro/Psychiatric: AOx3.  Normal mood and affect.   Cardiovascular: Normal carotid pulses bilaterally, no increasing jugular venous distention noted at cervical region bilaterally.    Respiratory: Normal respiratory effort, no stridor, no retractions noted.                 Assessment:  Karen was seen today for follow-up.    Diagnoses and all orders for this visit:    Seasonal allergic rhinitis due to pollen  -     levocetirizine (XYZAL) 5 MG tablet; Take 1 tablet (5 mg total) by mouth every evening.    Acute non-recurrent maxillary sinusitis  -     amoxicillin (AMOXIL) 500 MG Tab; Take 1 tablet (500 mg total) by mouth every 12 (twelve) hours. for 10 days    Rhinorrhea    Non-seasonal allergic rhinitis due to other allergic trigger              Plan:    Continue nasal sprays and zyrtec daily.  Gave patient Rx to try xyzal instead of zyrtec if desired.      Patient will take course of amoxicillin if the persistent sinus tenderness/sinusitis persists.     Use saline sinus rinses daily.      Follow up in 6 mos or PRN.      Terrie Stringer MD

## 2022-09-19 ENCOUNTER — HOSPITAL ENCOUNTER (INPATIENT)
Facility: HOSPITAL | Age: 72
LOS: 2 days | Discharge: HOME | DRG: 419 | End: 2022-09-22
Attending: EMERGENCY MEDICINE | Admitting: STUDENT IN AN ORGANIZED HEALTH CARE EDUCATION/TRAINING PROGRAM
Payer: MEDICARE

## 2022-09-19 DIAGNOSIS — K81.9 CHOLECYSTITIS: Primary | ICD-10-CM

## 2022-09-19 DIAGNOSIS — R50.9 FEVER: ICD-10-CM

## 2022-09-19 DIAGNOSIS — M54.9 UPPER BACK PAIN: ICD-10-CM

## 2022-09-19 LAB
ALBUMIN SERPL BCP-MCNC: 3.8 G/DL (ref 3.5–5.2)
ALP SERPL-CCNC: 78 U/L (ref 55–135)
ALT SERPL W/O P-5'-P-CCNC: 14 U/L (ref 10–44)
ANION GAP SERPL CALC-SCNC: 11 MMOL/L (ref 8–16)
AST SERPL-CCNC: 13 U/L (ref 10–40)
BILIRUB SERPL-MCNC: 0.4 MG/DL (ref 0.1–1)
BILIRUB UR QL STRIP: NEGATIVE
BUN SERPL-MCNC: 10 MG/DL (ref 8–23)
CALCIUM SERPL-MCNC: 9.4 MG/DL (ref 8.7–10.5)
CHLORIDE SERPL-SCNC: 99 MMOL/L (ref 95–110)
CLARITY UR: ABNORMAL
CO2 SERPL-SCNC: 26 MMOL/L (ref 23–29)
COLOR UR: YELLOW
CREAT SERPL-MCNC: 0.8 MG/DL (ref 0.5–1.4)
CTP QC/QA: YES
ERYTHROCYTE [DISTWIDTH] IN BLOOD BY AUTOMATED COUNT: 12.8 % (ref 11.5–14.5)
EST. GFR  (NO RACE VARIABLE): >60 ML/MIN/1.73 M^2
GLUCOSE SERPL-MCNC: 172 MG/DL (ref 70–110)
GLUCOSE UR QL STRIP: ABNORMAL
HCT VFR BLD AUTO: 35.8 % (ref 37–48.5)
HGB BLD-MCNC: 11.9 G/DL (ref 12–16)
HGB UR QL STRIP: NEGATIVE
INFLUENZA A, MOLECULAR: NEGATIVE
INFLUENZA B, MOLECULAR: NEGATIVE
KETONES UR QL STRIP: NEGATIVE
LEUKOCYTE ESTERASE UR QL STRIP: NEGATIVE
MCH RBC QN AUTO: 30 PG (ref 27–31)
MCHC RBC AUTO-ENTMCNC: 33.2 G/DL (ref 32–36)
MCV RBC AUTO: 90 FL (ref 82–98)
MICROSCOPIC COMMENT: ABNORMAL
NITRITE UR QL STRIP: NEGATIVE
PH UR STRIP: 8 [PH] (ref 5–8)
PLATELET # BLD AUTO: 233 K/UL (ref 150–450)
PMV BLD AUTO: 9.7 FL (ref 9.2–12.9)
POTASSIUM SERPL-SCNC: 3.5 MMOL/L (ref 3.5–5.1)
PROT SERPL-MCNC: 7.2 G/DL (ref 6–8.4)
PROT UR QL STRIP: ABNORMAL
RBC # BLD AUTO: 3.97 M/UL (ref 4–5.4)
RBC #/AREA URNS HPF: 26 /HPF (ref 0–4)
SARS-COV-2 RDRP RESP QL NAA+PROBE: NEGATIVE
SODIUM SERPL-SCNC: 136 MMOL/L (ref 136–145)
SP GR UR STRIP: 1.02 (ref 1–1.03)
SPECIMEN SOURCE: NORMAL
SQUAMOUS #/AREA URNS HPF: 5 /HPF
URN SPEC COLLECT METH UR: ABNORMAL
UROBILINOGEN UR STRIP-ACNC: NEGATIVE EU/DL
WBC # BLD AUTO: 9.1 K/UL (ref 3.9–12.7)
WBC #/AREA URNS HPF: 8 /HPF (ref 0–5)

## 2022-09-19 PROCEDURE — 85027 COMPLETE CBC AUTOMATED: CPT | Performed by: EMERGENCY MEDICINE

## 2022-09-19 PROCEDURE — 25500020 PHARM REV CODE 255: Performed by: EMERGENCY MEDICINE

## 2022-09-19 PROCEDURE — 80053 COMPREHEN METABOLIC PANEL: CPT | Performed by: EMERGENCY MEDICINE

## 2022-09-19 PROCEDURE — 63600175 PHARM REV CODE 636 W HCPCS: Performed by: EMERGENCY MEDICINE

## 2022-09-19 PROCEDURE — 96375 TX/PRO/DX INJ NEW DRUG ADDON: CPT

## 2022-09-19 PROCEDURE — 81000 URINALYSIS NONAUTO W/SCOPE: CPT | Performed by: EMERGENCY MEDICINE

## 2022-09-19 PROCEDURE — U0002 COVID-19 LAB TEST NON-CDC: HCPCS | Performed by: EMERGENCY MEDICINE

## 2022-09-19 PROCEDURE — 87502 INFLUENZA DNA AMP PROBE: CPT | Performed by: EMERGENCY MEDICINE

## 2022-09-19 PROCEDURE — 83690 ASSAY OF LIPASE: CPT | Performed by: EMERGENCY MEDICINE

## 2022-09-19 PROCEDURE — 99285 EMERGENCY DEPT VISIT HI MDM: CPT | Mod: 25

## 2022-09-19 RX ORDER — NAPROXEN SODIUM 220 MG
220 TABLET ORAL 2 TIMES DAILY PRN
COMMUNITY
End: 2023-06-13

## 2022-09-19 RX ORDER — CHOLECALCIFEROL (VITAMIN D3) 25 MCG
1000 TABLET ORAL DAILY
COMMUNITY

## 2022-09-19 RX ORDER — CETIRIZINE HYDROCHLORIDE 10 MG/1
10 TABLET ORAL DAILY
COMMUNITY

## 2022-09-19 RX ORDER — TURMERIC 400 MG
1 CAPSULE ORAL DAILY
COMMUNITY
End: 2023-06-13

## 2022-09-19 RX ORDER — ASPIRIN 81 MG/1
81 TABLET ORAL DAILY
COMMUNITY

## 2022-09-19 RX ORDER — ZINC SULFATE 50(220)MG
220 CAPSULE ORAL DAILY
COMMUNITY

## 2022-09-19 RX ORDER — ASCORBIC ACID 500 MG
500 TABLET ORAL DAILY
COMMUNITY

## 2022-09-19 RX ORDER — ONDANSETRON 2 MG/ML
4 INJECTION INTRAMUSCULAR; INTRAVENOUS
Status: COMPLETED | OUTPATIENT
Start: 2022-09-19 | End: 2022-09-19

## 2022-09-19 RX ORDER — CALCIUM CARBONATE 500(1250)
1 TABLET ORAL DAILY
COMMUNITY

## 2022-09-19 RX ORDER — MORPHINE SULFATE 4 MG/ML
4 INJECTION, SOLUTION INTRAMUSCULAR; INTRAVENOUS
Status: COMPLETED | OUTPATIENT
Start: 2022-09-19 | End: 2022-09-19

## 2022-09-19 RX ADMIN — MORPHINE SULFATE 4 MG: 4 INJECTION INTRAVENOUS at 07:09

## 2022-09-19 RX ADMIN — ONDANSETRON 4 MG: 2 INJECTION INTRAMUSCULAR; INTRAVENOUS at 07:09

## 2022-09-19 RX ADMIN — IOHEXOL 75 ML: 350 INJECTION, SOLUTION INTRAVENOUS at 10:09

## 2022-09-20 ENCOUNTER — ANESTHESIA EVENT (OUTPATIENT)
Dept: SURGERY | Facility: HOSPITAL | Age: 72
DRG: 419 | End: 2022-09-20
Payer: MEDICARE

## 2022-09-20 ENCOUNTER — ANESTHESIA (OUTPATIENT)
Dept: SURGERY | Facility: HOSPITAL | Age: 72
DRG: 419 | End: 2022-09-20
Payer: MEDICARE

## 2022-09-20 PROBLEM — K80.00 CHOLELITHIASIS WITH ACUTE CHOLECYSTITIS: Status: ACTIVE | Noted: 2022-09-20

## 2022-09-20 PROBLEM — K81.9 CHOLECYSTITIS: Status: ACTIVE | Noted: 2022-09-20

## 2022-09-20 LAB — LIPASE SERPL-CCNC: 10 U/L (ref 4–60)

## 2022-09-20 PROCEDURE — 88304 TISSUE EXAM BY PATHOLOGIST: CPT | Mod: 26,,, | Performed by: PATHOLOGY

## 2022-09-20 PROCEDURE — 63600175 PHARM REV CODE 636 W HCPCS: Performed by: EMERGENCY MEDICINE

## 2022-09-20 PROCEDURE — 36000709 HC OR TIME LEV III EA ADD 15 MIN: Performed by: STUDENT IN AN ORGANIZED HEALTH CARE EDUCATION/TRAINING PROGRAM

## 2022-09-20 PROCEDURE — 63600175 PHARM REV CODE 636 W HCPCS: Performed by: STUDENT IN AN ORGANIZED HEALTH CARE EDUCATION/TRAINING PROGRAM

## 2022-09-20 PROCEDURE — 47562 LAPAROSCOPIC CHOLECYSTECTOMY: CPT | Mod: ,,, | Performed by: STUDENT IN AN ORGANIZED HEALTH CARE EDUCATION/TRAINING PROGRAM

## 2022-09-20 PROCEDURE — 25000003 PHARM REV CODE 250: Performed by: EMERGENCY MEDICINE

## 2022-09-20 PROCEDURE — 37000009 HC ANESTHESIA EA ADD 15 MINS: Performed by: STUDENT IN AN ORGANIZED HEALTH CARE EDUCATION/TRAINING PROGRAM

## 2022-09-20 PROCEDURE — 88304 TISSUE EXAM BY PATHOLOGIST: CPT | Performed by: PATHOLOGY

## 2022-09-20 PROCEDURE — 25000003 PHARM REV CODE 250: Performed by: NURSE ANESTHETIST, CERTIFIED REGISTERED

## 2022-09-20 PROCEDURE — 47562 PR LAP,CHOLECYSTECTOMY: ICD-10-PCS | Mod: ,,, | Performed by: STUDENT IN AN ORGANIZED HEALTH CARE EDUCATION/TRAINING PROGRAM

## 2022-09-20 PROCEDURE — 63600175 PHARM REV CODE 636 W HCPCS: Performed by: NURSE ANESTHETIST, CERTIFIED REGISTERED

## 2022-09-20 PROCEDURE — 11000001 HC ACUTE MED/SURG PRIVATE ROOM

## 2022-09-20 PROCEDURE — 36000708 HC OR TIME LEV III 1ST 15 MIN: Performed by: STUDENT IN AN ORGANIZED HEALTH CARE EDUCATION/TRAINING PROGRAM

## 2022-09-20 PROCEDURE — 27201423 OPTIME MED/SURG SUP & DEVICES STERILE SUPPLY: Performed by: STUDENT IN AN ORGANIZED HEALTH CARE EDUCATION/TRAINING PROGRAM

## 2022-09-20 PROCEDURE — G0378 HOSPITAL OBSERVATION PER HR: HCPCS

## 2022-09-20 PROCEDURE — 25000003 PHARM REV CODE 250: Performed by: STUDENT IN AN ORGANIZED HEALTH CARE EDUCATION/TRAINING PROGRAM

## 2022-09-20 PROCEDURE — 37000008 HC ANESTHESIA 1ST 15 MINUTES: Performed by: STUDENT IN AN ORGANIZED HEALTH CARE EDUCATION/TRAINING PROGRAM

## 2022-09-20 PROCEDURE — 71000033 HC RECOVERY, INTIAL HOUR: Performed by: STUDENT IN AN ORGANIZED HEALTH CARE EDUCATION/TRAINING PROGRAM

## 2022-09-20 PROCEDURE — 99222 PR INITIAL HOSPITAL CARE,LEVL II: ICD-10-PCS | Mod: AI,57,, | Performed by: STUDENT IN AN ORGANIZED HEALTH CARE EDUCATION/TRAINING PROGRAM

## 2022-09-20 PROCEDURE — 96365 THER/PROPH/DIAG IV INF INIT: CPT

## 2022-09-20 PROCEDURE — 99222 1ST HOSP IP/OBS MODERATE 55: CPT | Mod: AI,57,, | Performed by: STUDENT IN AN ORGANIZED HEALTH CARE EDUCATION/TRAINING PROGRAM

## 2022-09-20 PROCEDURE — 96375 TX/PRO/DX INJ NEW DRUG ADDON: CPT

## 2022-09-20 PROCEDURE — 96366 THER/PROPH/DIAG IV INF ADDON: CPT

## 2022-09-20 PROCEDURE — 88304 PR  SURG PATH,LEVEL III: ICD-10-PCS | Mod: 26,,, | Performed by: PATHOLOGY

## 2022-09-20 RX ORDER — HYDROCODONE BITARTRATE AND ACETAMINOPHEN 5; 325 MG/1; MG/1
1 TABLET ORAL EVERY 4 HOURS PRN
Status: DISCONTINUED | OUTPATIENT
Start: 2022-09-20 | End: 2022-09-22 | Stop reason: HOSPADM

## 2022-09-20 RX ORDER — MIDAZOLAM HYDROCHLORIDE 1 MG/ML
INJECTION, SOLUTION INTRAMUSCULAR; INTRAVENOUS
Status: DISCONTINUED | OUTPATIENT
Start: 2022-09-20 | End: 2022-09-20

## 2022-09-20 RX ORDER — PROCHLORPERAZINE EDISYLATE 5 MG/ML
5 INJECTION INTRAMUSCULAR; INTRAVENOUS EVERY 6 HOURS PRN
Status: DISCONTINUED | OUTPATIENT
Start: 2022-09-20 | End: 2022-09-22 | Stop reason: HOSPADM

## 2022-09-20 RX ORDER — PROPOFOL 10 MG/ML
VIAL (ML) INTRAVENOUS
Status: DISCONTINUED | OUTPATIENT
Start: 2022-09-20 | End: 2022-09-20

## 2022-09-20 RX ORDER — ONDANSETRON 2 MG/ML
INJECTION INTRAMUSCULAR; INTRAVENOUS
Status: DISCONTINUED | OUTPATIENT
Start: 2022-09-20 | End: 2022-09-20

## 2022-09-20 RX ORDER — ONDANSETRON 2 MG/ML
4 INJECTION INTRAMUSCULAR; INTRAVENOUS DAILY PRN
Status: DISCONTINUED | OUTPATIENT
Start: 2022-09-20 | End: 2022-09-20

## 2022-09-20 RX ORDER — NEOSTIGMINE METHYLSULFATE 1 MG/ML
INJECTION, SOLUTION INTRAVENOUS
Status: DISCONTINUED | OUTPATIENT
Start: 2022-09-20 | End: 2022-09-20

## 2022-09-20 RX ORDER — ROCURONIUM BROMIDE 10 MG/ML
INJECTION, SOLUTION INTRAVENOUS
Status: DISCONTINUED | OUTPATIENT
Start: 2022-09-20 | End: 2022-09-20

## 2022-09-20 RX ORDER — SODIUM CHLORIDE 0.9 % (FLUSH) 0.9 %
10 SYRINGE (ML) INJECTION
Status: DISCONTINUED | OUTPATIENT
Start: 2022-09-20 | End: 2022-09-22 | Stop reason: HOSPADM

## 2022-09-20 RX ORDER — MORPHINE SULFATE 4 MG/ML
4 INJECTION, SOLUTION INTRAMUSCULAR; INTRAVENOUS EVERY 4 HOURS PRN
Status: DISCONTINUED | OUTPATIENT
Start: 2022-09-20 | End: 2022-09-22 | Stop reason: HOSPADM

## 2022-09-20 RX ORDER — FAMOTIDINE 10 MG/ML
20 INJECTION INTRAVENOUS EVERY 12 HOURS
Status: DISCONTINUED | OUTPATIENT
Start: 2022-09-20 | End: 2022-09-22

## 2022-09-20 RX ORDER — HYDROMORPHONE HYDROCHLORIDE 2 MG/ML
0.5 INJECTION, SOLUTION INTRAMUSCULAR; INTRAVENOUS; SUBCUTANEOUS EVERY 5 MIN PRN
Status: DISCONTINUED | OUTPATIENT
Start: 2022-09-20 | End: 2022-09-20

## 2022-09-20 RX ORDER — EPHEDRINE SULFATE 50 MG/ML
INJECTION, SOLUTION INTRAVENOUS
Status: DISCONTINUED | OUTPATIENT
Start: 2022-09-20 | End: 2022-09-20

## 2022-09-20 RX ORDER — DEXAMETHASONE SODIUM PHOSPHATE 4 MG/ML
INJECTION, SOLUTION INTRA-ARTICULAR; INTRALESIONAL; INTRAMUSCULAR; INTRAVENOUS; SOFT TISSUE
Status: DISCONTINUED | OUTPATIENT
Start: 2022-09-20 | End: 2022-09-20

## 2022-09-20 RX ORDER — BUPIVACAINE HYDROCHLORIDE 5 MG/ML
INJECTION, SOLUTION EPIDURAL; INTRACAUDAL
Status: DISCONTINUED | OUTPATIENT
Start: 2022-09-20 | End: 2022-09-20 | Stop reason: HOSPADM

## 2022-09-20 RX ORDER — LIDOCAINE HYDROCHLORIDE 20 MG/ML
INJECTION INTRAVENOUS
Status: DISCONTINUED | OUTPATIENT
Start: 2022-09-20 | End: 2022-09-20

## 2022-09-20 RX ORDER — SUCCINYLCHOLINE CHLORIDE 20 MG/ML
INJECTION INTRAMUSCULAR; INTRAVENOUS
Status: DISCONTINUED | OUTPATIENT
Start: 2022-09-20 | End: 2022-09-20

## 2022-09-20 RX ORDER — FENTANYL CITRATE 50 UG/ML
INJECTION, SOLUTION INTRAMUSCULAR; INTRAVENOUS
Status: DISCONTINUED | OUTPATIENT
Start: 2022-09-20 | End: 2022-09-20

## 2022-09-20 RX ORDER — ONDANSETRON 2 MG/ML
4 INJECTION INTRAMUSCULAR; INTRAVENOUS EVERY 8 HOURS PRN
Status: DISCONTINUED | OUTPATIENT
Start: 2022-09-20 | End: 2022-09-22 | Stop reason: HOSPADM

## 2022-09-20 RX ORDER — METOCLOPRAMIDE HYDROCHLORIDE 5 MG/ML
10 INJECTION INTRAMUSCULAR; INTRAVENOUS EVERY 10 MIN PRN
Status: DISCONTINUED | OUTPATIENT
Start: 2022-09-20 | End: 2022-09-22 | Stop reason: HOSPADM

## 2022-09-20 RX ORDER — HYDRALAZINE HYDROCHLORIDE 20 MG/ML
INJECTION INTRAMUSCULAR; INTRAVENOUS
Status: DISCONTINUED | OUTPATIENT
Start: 2022-09-20 | End: 2022-09-20

## 2022-09-20 RX ORDER — PHENYLEPHRINE HYDROCHLORIDE 10 MG/ML
INJECTION INTRAVENOUS
Status: DISCONTINUED | OUTPATIENT
Start: 2022-09-20 | End: 2022-09-20

## 2022-09-20 RX ORDER — TALC
6 POWDER (GRAM) TOPICAL NIGHTLY PRN
Status: DISCONTINUED | OUTPATIENT
Start: 2022-09-20 | End: 2022-09-22 | Stop reason: HOSPADM

## 2022-09-20 RX ORDER — MORPHINE SULFATE 2 MG/ML
2 INJECTION, SOLUTION INTRAMUSCULAR; INTRAVENOUS EVERY 4 HOURS PRN
Status: DISCONTINUED | OUTPATIENT
Start: 2022-09-20 | End: 2022-09-22 | Stop reason: HOSPADM

## 2022-09-20 RX ORDER — HYDROMORPHONE HYDROCHLORIDE 2 MG/ML
0.2 INJECTION, SOLUTION INTRAMUSCULAR; INTRAVENOUS; SUBCUTANEOUS EVERY 5 MIN PRN
Status: DISCONTINUED | OUTPATIENT
Start: 2022-09-20 | End: 2022-09-20

## 2022-09-20 RX ORDER — HYDROCODONE BITARTRATE AND ACETAMINOPHEN 5; 325 MG/1; MG/1
1 TABLET ORAL
Status: DISCONTINUED | OUTPATIENT
Start: 2022-09-20 | End: 2022-09-20

## 2022-09-20 RX ORDER — ACETAMINOPHEN 10 MG/ML
INJECTION, SOLUTION INTRAVENOUS
Status: DISCONTINUED | OUTPATIENT
Start: 2022-09-20 | End: 2022-09-20

## 2022-09-20 RX ADMIN — PIPERACILLIN AND TAZOBACTAM 4.5 G: 4; .5 INJECTION, POWDER, LYOPHILIZED, FOR SOLUTION INTRAVENOUS; PARENTERAL at 08:09

## 2022-09-20 RX ADMIN — MIDAZOLAM 2 MG: 1 INJECTION INTRAMUSCULAR; INTRAVENOUS at 05:09

## 2022-09-20 RX ADMIN — ONDANSETRON 8 MG: 2 INJECTION, SOLUTION INTRAMUSCULAR; INTRAVENOUS at 07:09

## 2022-09-20 RX ADMIN — HYDROMORPHONE HYDROCHLORIDE 0.5 MG: 2 INJECTION, SOLUTION INTRAMUSCULAR; INTRAVENOUS; SUBCUTANEOUS at 07:09

## 2022-09-20 RX ADMIN — LIDOCAINE HYDROCHLORIDE 80 MG: 20 INJECTION, SOLUTION INTRAVENOUS at 05:09

## 2022-09-20 RX ADMIN — GLYCOPYRROLATE 0.6 MG: 0.2 INJECTION, SOLUTION INTRAMUSCULAR; INTRAVITREAL at 07:09

## 2022-09-20 RX ADMIN — PHENYLEPHRINE HYDROCHLORIDE 200 MCG: 10 INJECTION INTRAVENOUS at 06:09

## 2022-09-20 RX ADMIN — EPHEDRINE SULFATE 10 MG: 50 INJECTION, SOLUTION INTRAMUSCULAR; INTRAVENOUS; SUBCUTANEOUS at 06:09

## 2022-09-20 RX ADMIN — PROPOFOL 40 MG: 10 INJECTION, EMULSION INTRAVENOUS at 06:09

## 2022-09-20 RX ADMIN — PROPOFOL 30 MG: 10 INJECTION, EMULSION INTRAVENOUS at 06:09

## 2022-09-20 RX ADMIN — FAMOTIDINE 20 MG: 10 INJECTION INTRAVENOUS at 09:09

## 2022-09-20 RX ADMIN — HYDRALAZINE HYDROCHLORIDE 10 MG: 20 INJECTION, SOLUTION INTRAMUSCULAR; INTRAVENOUS at 07:09

## 2022-09-20 RX ADMIN — PROPOFOL 150 MG: 10 INJECTION, EMULSION INTRAVENOUS at 05:09

## 2022-09-20 RX ADMIN — PIPERACILLIN AND TAZOBACTAM 4.5 G: 4; .5 INJECTION, POWDER, LYOPHILIZED, FOR SOLUTION INTRAVENOUS; PARENTERAL at 01:09

## 2022-09-20 RX ADMIN — SUCCINYLCHOLINE CHLORIDE 140 MG: 20 INJECTION, SOLUTION INTRAMUSCULAR; INTRAVENOUS at 05:09

## 2022-09-20 RX ADMIN — DEXAMETHASONE SODIUM PHOSPHATE 8 MG: 4 INJECTION, SOLUTION INTRA-ARTICULAR; INTRALESIONAL; INTRAMUSCULAR; INTRAVENOUS; SOFT TISSUE at 06:09

## 2022-09-20 RX ADMIN — NEOSTIGMINE METHYLSULFATE 4 MG: 1 INJECTION INTRAVENOUS at 07:09

## 2022-09-20 RX ADMIN — SODIUM CHLORIDE, SODIUM LACTATE, POTASSIUM CHLORIDE, AND CALCIUM CHLORIDE: .6; .31; .03; .02 INJECTION, SOLUTION INTRAVENOUS at 05:09

## 2022-09-20 RX ADMIN — ROCURONIUM BROMIDE 30 MG: 10 INJECTION, SOLUTION INTRAVENOUS at 06:09

## 2022-09-20 RX ADMIN — ACETAMINOPHEN 1000 MG: 10 INJECTION, SOLUTION INTRAVENOUS at 06:09

## 2022-09-20 RX ADMIN — FENTANYL CITRATE 100 MCG: 50 INJECTION, SOLUTION INTRAMUSCULAR; INTRAVENOUS at 05:09

## 2022-09-20 NOTE — HPI
Ms. Arango is a 70 yo female with PMH including HTN and GERD who presents with RUQ abdominal pain with radiation to the back since yesterday. The pain has been severe and associated with nausea, but no emesis. No fever or chills. Endorses constipation, but no diarrhea. Workup in the ED demonstrated normal lab work, with RUQ demonstrating: Sonographic findings of cholelithiasis and cholecystitis.

## 2022-09-20 NOTE — ED PROVIDER NOTES
"Encounter Date: 9/19/2022       History     Chief Complaint   Patient presents with    Back Pain     Lower back pain with fever that began yesterday afternoon, + intermittent nausea, denies urinary symptoms      Karen Arango is a 71 y.o. female who  has a past medical history of Essential (primary) hypertension (10/27/2015), Gastro-esophageal reflux disease without esophagitis (10/27/2015), Mixed hyperlipidemia (10/27/2015), and Tachyarrhythmia (9/17/2019).    The patient presents to the ED due to back pain and fever.   Patient reports symptoms started yesterday with back pain. She reports sudden onset of pain in a horizontal distribution across the middle of her back. She also states she had feer 100.8 F earlier today. She took a dose of Tylenol and ibuprofen yesterday without improvement, but has not taken anything today for pain or fever. She states she feels "icky" and has noticed decreased urine output, but she denies any associated nausea/vomiting/diarrhea, abdominal pain, arm/leg pain, chest pain, SOB, cough, or dysuria.     Review of patient's allergies indicates:   Allergen Reactions    Adhesive tape-silicones      Other reaction(s): Blisters     Past Medical History:   Diagnosis Date    Essential (primary) hypertension 10/27/2015    Gastro-esophageal reflux disease without esophagitis 10/27/2015    Mixed hyperlipidemia 10/27/2015    Tachyarrhythmia 9/17/2019     Past Surgical History:   Procedure Laterality Date    LAPAROSCOPIC CHOLECYSTECTOMY N/A 9/20/2022    Procedure: CHOLECYSTECTOMY, LAPAROSCOPIC;  Surgeon: Saud Cooley MD;  Location: [facility];  Service: General;  Laterality: N/A;     History reviewed. No pertinent family history.  Social History     Tobacco Use    Smoking status: Never    Smokeless tobacco: Former     Review of Systems   Constitutional:  Negative for chills and fever.   HENT:  Negative for sore throat.    Respiratory:  Negative for cough and shortness of breath.  "   Cardiovascular:  Negative for chest pain.   Gastrointestinal:  Negative for nausea and vomiting.   Genitourinary:  Negative for dysuria, frequency and urgency.   Musculoskeletal:  Negative for back pain.   Skin:  Negative for rash and wound.   Neurological:  Negative for syncope and weakness.   Hematological:  Does not bruise/bleed easily.   Psychiatric/Behavioral:  Negative for agitation, behavioral problems and confusion.      Physical Exam     Initial Vitals [09/19/22 1859]   BP Pulse Resp Temp SpO2   (!) 192/90 84 18 99.1 °F (37.3 °C) 98 %      MAP       --         Physical Exam    Nursing note and vitals reviewed.  Constitutional: She appears well-developed and well-nourished. She is not diaphoretic. No distress.   HENT:   Head: Normocephalic and atraumatic.   Mouth/Throat: Oropharynx is clear and moist.   Eyes: EOM are normal. Pupils are equal, round, and reactive to light.   Neck: No tracheal deviation present.   Cardiovascular:  Normal rate, regular rhythm, normal heart sounds and intact distal pulses.           Pulmonary/Chest: Breath sounds normal. No stridor. No respiratory distress. She has no wheezes.   Abdominal: Abdomen is soft. Bowel sounds are normal. She exhibits no distension and no mass. There is no abdominal tenderness.   Musculoskeletal:         General: No edema. Normal range of motion.      Cervical back: No bony tenderness.      Lumbar back: No bony tenderness.        Back:       Comments: Muscular tenderness without midline spinal tenderness.   No skin changes.      Neurological: She is alert and oriented to person, place, and time. She has normal strength. No cranial nerve deficit or sensory deficit.   Skin: Skin is warm and dry. Capillary refill takes less than 2 seconds. No pallor.   Psychiatric: She has a normal mood and affect. Her behavior is normal. Thought content normal.       ED Course   Procedures  Labs Reviewed   CBC WITHOUT DIFFERENTIAL - Abnormal; Notable for the following  components:       Result Value    RBC 3.97 (*)     Hemoglobin 11.9 (*)     Hematocrit 35.8 (*)     All other components within normal limits   COMPREHENSIVE METABOLIC PANEL - Abnormal; Notable for the following components:    Glucose 172 (*)     All other components within normal limits   URINALYSIS, REFLEX TO URINE CULTURE - Abnormal; Notable for the following components:    Appearance, UA Cloudy (*)     Protein, UA Trace (*)     Glucose, UA 2+ (*)     All other components within normal limits    Narrative:     Specimen Source->Urine   URINALYSIS MICROSCOPIC - Abnormal; Notable for the following components:    RBC, UA 26 (*)     WBC, UA 8 (*)     All other components within normal limits    Narrative:     Specimen Source->Urine   INFLUENZA A & B BY MOLECULAR   LIPASE   LIPASE   SARS-COV-2 RDRP GENE          Imaging Results               US Abdomen Limited (Final result)  Result time 09/20/22 01:38:03      Final result by Chip Knapp MD (09/20/22 01:38:03)                   Impression:      Sonographic findings of cholelithiasis and cholecystitis.    No biliary ductal dilatation.    This report was flagged in Epic as abnormal.      Electronically signed by: Chip Knapp  Date:    09/20/2022  Time:    01:38               Narrative:    EXAMINATION:  US ABDOMEN LIMITED    CLINICAL HISTORY:  abdominal pain, back pain, eval for cholecystitis;    TECHNIQUE:  Limited ultrasound of the right upper quadrant of the abdomen (including pancreas, liver, gallbladder, common bile duct, and spleen) was performed.    COMPARISON:  None.    FINDINGS:  Liver: Normal in size, measuring 16.1 cm. Homogeneous echotexture. No focal hepatic lesions.    Gallbladder: Multiple calculi with the largest measuring 10 mm.  The wall is thickened at 7.2 mm with pericholecystic fluid and hypervascular wall.  A positive sonographic Alfredo sign is present.    Biliary system: The common duct is not dilated, measuring 5.4 mm.  No intrahepatic  ductal dilatation.    Spleen: Normal in size and echotexture, measuring 8.9 x 5.4 cm.    Miscellaneous: No upper abdominal ascites.                                        CT Chest Abdomen Pelvis With Contrast (xpd) (Final result)  Result time 09/19/22 23:34:03      Final result by Chip Knapp MD (09/19/22 23:34:03)                   Impression:      Features of cholelithiasis and cholecystitis.  Stone within the gallbladder and 1 suspected within the cystic duct causing obstruction.  Correlate with liver enzymes and bilirubin level.  Consider HIDA scan if clinically warranted.    Cystic mass within the pelvis adjacent to the iliac vessels and urinary bladder.  The differential is extensive and includes lymphocele, enteric duplication cyst or cyst of gyn origin.  It has a benign appearance with no inflammation or significant solid component.    No evidence of  tract stone or obstruction.    No evidence of acute finding within the chest.    This report was flagged in Epic as abnormal.      Electronically signed by: Chip Knapp  Date:    09/19/2022  Time:    23:34               Narrative:    EXAMINATION:  CT CHEST ABDOMEN PELVIS WITH CONTRAST (XPD)    CLINICAL HISTORY:  Sepsis;    TECHNIQUE:  Arterial phase axial CT images were obtained through the chest, abdomen and pelvis following IV administration of 80 mL of Omnipaque 350 contrast. Coronal, sagittal and 3D reconstructions were submitted and interpreted. Dose reduction techniques including automatic exposure control (AEC) were utilized.    COMPARISON:  None    FINDINGS:  Base of Neck: No significant abnormality.    Thoracic soft tissues: Unremarkable.    Heart: Normal size. No effusion.    Pulmonary vasculature: Pulmonary arteries distribute normally.  There are four pulmonary veins.    Antonia/Mediastinum: No pathologic jeimy enlargement.    Esophagus: Unremarkable.    Airways: Patent.    Lungs/Pleura: Clear lungs. No pleural effusion or thickening.   Minimal scattered fibrotic type changes mainly in the lung bases left greater than right.    Liver: Normal in size and attenuation, with no focal hepatic lesions.    Gallbladder: Features of cholecystitis with wall thickening pericholecystic fluid and mild inflammation with at least 1 calcified gallstone in the gallbladder and a 2nd or 2 suggested in the cystic duct best seen on image 67 of 601.    Bile Ducts: No evidence of dilated ducts.    Pancreas: No mass or peripancreatic fat stranding.    Spleen: Unremarkable.    Adrenals: Unremarkable.    Kidneys/ Ureters: No radiodense stones or hydronephrosis.  The ureters are normal in course and caliber.  Small low densities compatible with cysts.    Bladder: No evidence of wall thickening.    Reproductive organs: Unremarkable.    GI Tract/Mesentery: No evidence of bowel obstruction or inflammation.    Peritoneal Space: No ascites. No free air.  6.8 x 6.3 x 5.1 cm cystic structure within the pelvis just above the bladder to the right of midline.  The ureter appears to traverse behind it.  It is anterior to the psoas muscle posterolateral to the urinary bladder.    Retroperitoneum: No significant adenopathy.    Abdominal wall: Unremarkable.    Bones: No acute fracture or aggressive-appearing lytic or blastic lesion.  Compensated thoracolumbar scoliosis                                       X-Ray Chest PA And Lateral (Final result)  Result time 09/19/22 20:56:30      Final result by Ruth Knapp MD (09/19/22 20:56:30)                   Impression:      Mild lung base linear opacities bilaterally.  Correlate for pneumonitis without consolidation.      Electronically signed by: Ruth Knapp  Date:    09/19/2022  Time:    20:56               Narrative:    EXAMINATION:  XR CHEST PA AND LATERAL    CLINICAL HISTORY:  Fever, unspecified    TECHNIQUE:  PA and lateral views of the chest were performed.    COMPARISON:  05/05/2009 right rib views    FINDINGS:  There is  moderate dextroscoliosis of the thoracic spine.  The cardiomediastinal silhouette is nonenlarged.  Linear lung base opacities bilaterally raise question of mild pneumonitis.  There is no focal consolidation pleural effusion or pneumothorax.                                       Medications   morphine injection 4 mg (4 mg Intravenous Given 9/19/22 1935)   ondansetron injection 4 mg (4 mg Intravenous Given 9/19/22 1936)   iohexoL (OMNIPAQUE 350) injection 75 mL (75 mLs Intravenous Given 9/19/22 2255)   piperacillin-tazobactam 4.5 g in dextrose 5 % 100 mL IVPB (ready to mix system) (0 g Intravenous Stopped 9/20/22 0219)   bisacodyL EC tablet 5 mg (5 mg Oral Given 9/21/22 1844)   polyethylene glycol packet 17 g (17 g Oral Given 9/21/22 2012)   senna-docusate 8.6-50 mg per tablet 1 tablet (1 tablet Oral Given 9/22/22 0839)     Medical Decision Making:   Initial Assessment:   72 yo F with back pain and fever.  Will obtain labs, UA, CXR, and reassess.  Differential Diagnosis:   Differential Diagnosis includes, but is not limited to:  Sepsis, bacteremia, UTI, pneumonia, cellulitis, abscess, indwelling line/catheter infection, cholecystitis, viral URI, gastroenteritis, viral syndrome, sinusitis, otitis media/externa, neoplasm, drug reaction, serotonin syndrome, intoxication/withdrawal syndrome.    Clinical Tests:   Lab Tests: Ordered and Reviewed  Radiological Study: Ordered and Reviewed  ED Management:  CT concerning for cholecystitis.  US ordered, pending. Oncoming physician to assume care, f/u results. Anticipate discussion with Surgery if US consistent with acute cholecystitis.            ED Course as of 10/24/22 1023   Mon Sep 19, 2022   2106 Labs unremarkable, UA with 26 RBCs, small amount of WBC, no urinary complaints, inconsistent with UTI.   CXR with nonspecific pneumonitis.  Patient with persistent symptoms, will obtain CT chest to evaluate for infectious versus musculoskeletal process. [SS]      ED Course User  Index  [SS] Shade Turner MD                 Clinical Impression:   Final diagnoses:  [R50.9] Fever  [M54.9] Upper back pain  [K81.9] Cholecystitis (Primary)      ED Disposition Condition    Admit Stable                Shade Turner MD  09/20/22 0014       Shade Turner MD  10/24/22 1023

## 2022-09-20 NOTE — PROVIDER PROGRESS NOTES - EMERGENCY DEPT.
Encounter Date: 9/19/2022    ED Physician Progress Notes            Received sign-out from Dr. Turner.  Plan was follow-up ultrasound in blood work.  Labs imaging reviewed.  Findings consistent with cholecystitis.  Will plan admission to surgery.  Patient understood agreed with plan.

## 2022-09-20 NOTE — ASSESSMENT & PLAN NOTE
72 yo female presenting with acute cholecystitis. History, exam, and imaging correlate with diagnosis. I discussed the indication for surgery, as well as risks and benefits, and she would like to proceed.    - Keep NPO  - Continue antibiotics  - Consent signed  - OR later today for laparoscopic cholecystectomy and all indicated procedures

## 2022-09-20 NOTE — ANESTHESIA PREPROCEDURE EVALUATION
09/20/2022  Karen Arango is a 71 y.o., female with PMH HTN and allergic asthma (no recent use of inhaler) admitted for cholecystitis; scheduled for lap [place].    Past Medical History:   Diagnosis Date    Essential (primary) hypertension 10/27/2015    Gastro-esophageal reflux disease without esophagitis 10/27/2015    Mixed hyperlipidemia 10/27/2015    Tachyarrhythmia 9/17/2019     Patient Active Problem List   Diagnosis    Bilateral sciatica    Essential (primary) hypertension    Gastro-esophageal reflux disease without esophagitis    Heart murmur    Insomnia    Mixed hyperlipidemia    Adjustment disorder    Cholelithiasis with acute cholecystitis     No current facility-administered medications on file prior to encounter.     Current Outpatient Medications on File Prior to Encounter   Medication Sig Dispense Refill    ascorbic acid, vitamin C, (VITAMIN C) 500 MG tablet Take 500 mg by mouth once daily.      aspirin (ECOTRIN) 81 MG EC tablet Take 81 mg by mouth once daily.      azelastine (ASTELIN) 137 mcg (0.1 %) nasal spray 1 spray (137 mcg total) by Nasal route 2 (two) times daily. 30 mL 11    biotin 300 mcg Tab Take 1 tablet by mouth once daily.      calcium carbonate (OS-AMANDA) 500 mg calcium (1,250 mg) tablet Take 1 tablet by mouth once daily.      cetirizine (ZYRTEC) 10 MG tablet Take 10 mg by mouth once daily.      LOTEMAX SM 0.38 % DrpG Place 1 drop into the right eye 3 (three) times daily.      naproxen sodium (ANAPROX) 220 MG tablet Take 220 mg by mouth 2 (two) times daily as needed.      pravastatin (PRAVACHOL) 40 MG tablet TAKE 1 TABLET EVERY DAY (Patient taking differently: Take 40 mg by mouth every evening.) 90 tablet 3    RESTASIS 0.05 % ophthalmic emulsion Place 1 drop into both eyes 2 (two) times daily.      turmeric 400 mg Cap Take 1 capsule by mouth Daily.      vitamin D  (VITAMIN D3) 1000 units Tab Take 1,000 Units by mouth once daily.      zinc sulfate (ZINCATE) 50 mg zinc (220 mg) capsule Take 220 mg by mouth once daily.      albuterol (VENTOLIN HFA) 90 mcg/actuation inhaler Inhale 2 puffs into the lungs every 6 (six) hours as needed for Shortness of Breath. Rescue 18 g 1     No past surgical history on file.  Review of patient's allergies indicates:   Allergen Reactions    Adhesive tape-silicones      Other reaction(s): Blisters     Recent Labs   Lab 09/19/22 1930   WBC 9.10   RBC 3.97*   HGB 11.9*   HCT 35.8*      MCV 90   MCH 30.0   MCHC 33.2     Recent Labs   Lab 09/19/22 1930   CALCIUM 9.4   PROT 7.2      K 3.5   CO2 26   CL 99   BUN 10   CREATININE 0.8   ALKPHOS 78   ALT 14   AST 13   BILITOT 0.4           Pre-op Assessment    I have reviewed the Patient Summary Reports.     I have reviewed the Nursing Notes. I have reviewed the NPO Status.   I have reviewed the Medications.     Review of Systems  Anesthesia Hx:  No problems with previous Anesthesia    Social:  Non-Smoker, No Alcohol Use    Hematology/Oncology:     Oncology Normal    -- Anemia:   EENT/Dental:EENT/Dental Normal   Cardiovascular:   Exercise tolerance: good Hypertension, well controlled    Pulmonary:   Asthma mild    Renal/:  Renal/ Normal     Hepatic/GI:  Hepatic/GI Normal  Denies GERD.    Musculoskeletal:  Musculoskeletal Normal    Neurological:  Neurology Normal    Endocrine:  Endocrine Normal    Psych:  Psychiatric Normal           Physical Exam  General: Well nourished, Cooperative, Alert and Oriented    Airway:  Mallampati: II   Mouth Opening: Normal  TM Distance: Normal  Neck ROM: Normal ROM    Dental:  Intact        Anesthesia Plan  Type of Anesthesia, risks & benefits discussed:    Anesthesia Type: Gen ETT  Intra-op Monitoring Plan: Standard ASA Monitors  Post Op Pain Control Plan: multimodal analgesia  Induction:  IV  Informed Consent: Informed consent signed with the Patient and  all parties understand the risks and agree with anesthesia plan.  All questions answered.   ASA Score: 2    Ready For Surgery From Anesthesia Perspective.     .

## 2022-09-20 NOTE — ED NOTES
Presents with back pain, onset yesterday, nil urinary symptoms, some mild nausea reported.    Airway patent,  Breathing spontaneously, maintaining oxygen saturations above 95% on room air  Heart rate within normal limits, hypertensive, bilateral blood pressures equal  Alert and orientated  Afebrile, moderate to severe reports of pain.    IV inserted, path collected and sent to lab.  Urine sent to lab  Medications as charted.    KATIE SOUSA

## 2022-09-20 NOTE — PLAN OF CARE
[facility] - Telemetry  Initial Discharge Assessment       Primary Care Provider: Shade Bustos MD    Admission Diagnosis: Cholecystitis [K81.9]  Upper back pain [M54.9]  Fever [R50.9]    Admission Date: 9/19/2022  Expected Discharge Date:      also provided Medicare Outpatient Observation Notice to pt, who signed it and kept a copy for her records.     Discharge Barriers Identified: (P) None    Payor: [employer] MANAGED MEDICARE / Plan: [employer] MEDICARE HMO / Product Type: Capitation /     Extended Emergency Contact Information  Primary Emergency Contact: Aleja Young  Home Phone: [phone]  Relation: Daughter    Discharge Plan A: (P) Home  Discharge Plan B: (P) Home with family      [facility] [place] - [address]  [address]  [place] [address]  Phone: [phone] Fax: [phone]    [facility] - [facility] [address] [facility]  [address] [facility] [address]  Phone: [phone] Fax: [phone]    [id number] - [place] - [address] [facility]  [address]  [place] [address]  Phone: [phone] Fax: [phone]    [id number] - [place] - [address] [facility]  [address] [place] [address]  Phone: [phone] Fax: [phone]      Initial Assessment (most recent)       Adult Discharge Assessment - 09/20/22 1543          Discharge Assessment    Assessment Type Discharge Planning Assessment (P)      Confirmed/corrected address, phone number and insurance Yes (P)      Source of Information patient (P)      Does patient/caregiver understand observation status Yes (P)      Lives With alone (P)      Do you expect to return to your current living situation? Yes (P)      Do you have help at home or someone to help you manage your care at home? Yes (P)      Who are your caregiver(s) and their phone number(s)? HectorAleja yanez (Daughter)   [phone]  (P)      Prior to hospitilization cognitive status: Alert/Oriented (P)      Current cognitive status: Alert/Oriented (P)      Walking or Climbing Stairs Difficulty none (P)      Dressing/Bathing Difficulty none (P)      Home Accessibility wheelchair accessible (P)      Equipment Currently Used at Home -- (P)    pt states she has wc, walker, can, shower chair if needed from late     Readmission within 30 days? No (P)      Patient currently being followed by outpatient case management? No (P)      Do you take prescription medications? Yes (P)      Do you have prescription coverage? Yes (P)      Do you have any problems affording any of your prescribed medications? No (P)      Is the patient taking medications as prescribed? yes (P)      Who is going to help you get home at discharge? Aleja Young (Daughter)   [phone] (P)      How do you get to doctors appointments? car, drives self (P)      Are you on dialysis? No (P)      Do you take coumadin? No (P)      Discharge Plan A Home (P)      Discharge Plan B Home with family (P)      DME Needed Upon Discharge  none (P)      Discharge Plan discussed with: Patient (P)      Discharge Barriers Identified None (P)         Physical Activity    On average, how many days per week do you engage in moderate to strenuous exercise (like a brisk walk)? 4 days (P)      On average, how many minutes do you engage in exercise at this level? 40 min (P)    dancing socially       Financial Resource Strain    How hard is it for you to pay for the very basics like food, housing, medical care, and heating? Not hard at all (P)         Housing Stability    In the last 12 months, was there a time when you were not able to pay the mortgage or rent on time? No (P)      In the last 12 months, how many places have you lived? 1 (P)      In the last 12 months, was there a time when you did not have a steady place to sleep or slept in a shelter (including now)? No (P)         Transportation  Needs    In the past 12 months, has lack of transportation kept you from medical appointments or from getting medications? No (P)      In the past 12 months, has lack of transportation kept you from meetings, work, or from getting things needed for daily living? No (P)         Food Insecurity    Within the past 12 months, you worried that your food would run out before you got the money to buy more. Never true (P)      Within the past 12 months, the food you bought just didn't last and you didn't have money to get more. Never true (P)         Stress    Do you feel stress - tense, restless, nervous, or anxious, or unable to sleep at night because your mind is troubled all the time - these days? To some extent (P)         Social Connections    In a typical week, how many times do you talk on the phone with family, friends, or neighbors? More than three times a week (P)      How often do you get together with friends or relatives? Once a week (P)      Do you belong to any clubs or organizations such as Mosque groups, unions, fraternal or athletic groups, or school groups? Yes (P)      Are you , , , , never , or living with a partner?  (P)                         Future Appointments   Date Time Provider Department Center   10/5/2022  2:40 PM Saud Cooley MD [facility] spoke with pt at bedside. SW presented HIGUERA notice, pt signed. Pt lives independently, Aleja Young (Daughter) [phone] will transport home at time of d/c. Pt has surgery f/u appt listed above. No DME or HH needs at this time. SW updated pt contacts to include her daughter.     Corine Arias, [employer] [facility]  ED Social Work  [phone]

## 2022-09-20 NOTE — ANESTHESIA PROCEDURE NOTES
Intubation    Date/Time: 9/20/2022 6:01 PM  Performed by: Helen To CRNA  Authorized by: Glo Modi MD     Intubation:     Induction:  Intravenous    Intubated:  Postinduction    Mask Ventilation:  N/a    Attempts:  1    Attempted By:  Student (RAQUEL Lyles)    Method of Intubation:  Direct    Blade:  Santiago 3    Laryngeal View Grade: Grade I - full view of cords      Difficult Airway Encountered?: No      Complications:  None    Airway Device:  Oral endotracheal tube    Airway Device Size:  7.0    Style/Cuff Inflation:  Cuffed (inflated to minimal occlusive pressure)    Tube secured:  22    Secured at:  The lips    Placement Verified By:  Capnometry    Complicating Factors:  None    Findings Post-Intubation:  BS equal bilateral and atraumatic/condition of teeth unchanged

## 2022-09-20 NOTE — ED NOTES
Settled, awaiting disposition    RS RN   [History reviewed] : History reviewed. [Medications and Allergies reviewed] : Medications and allergies reviewed.

## 2022-09-20 NOTE — H&P
[facility] - Emergency Dept  General Surgery  History & Physical    Patient Name: Karen Arango  MRN: [id number]  Admission Date: 9/19/2022  Attending Physician: No att. providers found   Primary Care Provider: Shade Bustos MD    Patient information was obtained from patient and ER records.     Subjective:     Chief Complaint/Reason for Admission: Abdominal and back pain    History of Present Illness: Ms. Arango is a 70 yo female with PMH including HTN and GERD who presents with RUQ abdominal pain with radiation to the back since yesterday. The pain has been severe and associated with nausea, but no emesis. No fever or chills. Endorses constipation, but no diarrhea. Workup in the ED demonstrated normal lab work, with RUQ demonstrating: Sonographic findings of cholelithiasis and cholecystitis.      No current facility-administered medications on file prior to encounter.     Current Outpatient Medications on File Prior to Encounter   Medication Sig    ascorbic acid, vitamin C, (VITAMIN C) 500 MG tablet Take 500 mg by mouth once daily.    aspirin (ECOTRIN) 81 MG EC tablet Take 81 mg by mouth once daily.    azelastine (ASTELIN) 137 mcg (0.1 %) nasal spray 1 spray (137 mcg total) by Nasal route 2 (two) times daily.    biotin 300 mcg Tab Take 1 tablet by mouth once daily.    calcium carbonate (OS-AMANDA) 500 mg calcium (1,250 mg) tablet Take 1 tablet by mouth once daily.    cetirizine (ZYRTEC) 10 MG tablet Take 10 mg by mouth once daily.    LOTEMAX SM 0.38 % DrpG Place 1 drop into the right eye 3 (three) times daily.    naproxen sodium (ANAPROX) 220 MG tablet Take 220 mg by mouth 2 (two) times daily as needed.    pravastatin (PRAVACHOL) 40 MG tablet TAKE 1 TABLET EVERY DAY (Patient taking differently: Take 40 mg by mouth every evening.)    RESTASIS 0.05 % ophthalmic emulsion Place 1 drop into both eyes 2 (two) times daily.    turmeric 400 mg Cap Take 1 capsule by mouth Daily.    vitamin D (VITAMIN D3) 1000 units Tab  Take 1,000 Units by mouth once daily.    zinc sulfate (ZINCATE) 50 mg zinc (220 mg) capsule Take 220 mg by mouth once daily.    albuterol (VENTOLIN HFA) 90 mcg/actuation inhaler Inhale 2 puffs into the lungs every 6 (six) hours as needed for Shortness of Breath. Rescue       Review of patient's allergies indicates:   Allergen Reactions    Adhesive tape-silicones      Other reaction(s): Blisters       Past Medical History:   Diagnosis Date    Essential (primary) hypertension 10/27/2015    Gastro-esophageal reflux disease without esophagitis 10/27/2015    Mixed hyperlipidemia 10/27/2015    Tachyarrhythmia 9/17/2019     No past surgical history on file.  Family History    None       Tobacco Use    Smoking status: Never    Smokeless tobacco: Former   Substance and Sexual Activity    Alcohol use: Not on file    Drug use: Not on file    Sexual activity: Not on file     Review of Systems   Constitutional:  Negative for chills and fever.   Respiratory:  Negative for cough and shortness of breath.    Cardiovascular:  Negative for chest pain.   Gastrointestinal:  Positive for abdominal pain, constipation and nausea. Negative for diarrhea and vomiting.   Genitourinary:  Negative for dysuria.   Musculoskeletal:  Positive for back pain.   Neurological:  Negative for light-headedness and headaches.   Objective:     Vital Signs (Most Recent):  Temp: 99.1 °F (37.3 °C) (09/19/22 1859)  Pulse: 89 (09/20/22 0600)  Resp: 16 (09/19/22 1935)  BP: 127/61 (09/20/22 0530)  SpO2: (!) 94 % (09/20/22 0600)   Vital Signs (24h Range):  Temp:  [99.1 °F (37.3 °C)] 99.1 °F (37.3 °C)  Pulse:  [70-89] 89  Resp:  [16-18] 16  SpO2:  [92 %-98 %] 94 %  BP: (110-197)/() 127/61     Weight: 77.6 kg (171 lb)  Body mass index is 30.29 kg/m².    Physical Exam  Vitals reviewed.   Constitutional:       Appearance: Normal appearance.   Cardiovascular:      Rate and Rhythm: Normal rate.   Pulmonary:      Effort: Pulmonary effort is normal.    Abdominal:      Palpations: Abdomen is soft.      Tenderness: There is abdominal tenderness.      Comments: RUQ tenderness, otherwise non tender. No rigidity or guarding.   Skin:     General: Skin is warm and dry.   Neurological:      General: No focal deficit present.      Mental Status: She is alert and oriented to person, place, and time.       Significant Labs:  I have reviewed all pertinent lab results within the past 24 hours.  CBC:   Recent Labs   Lab 09/19/22 1930   WBC 9.10   RBC 3.97*   HGB 11.9*   HCT 35.8*      MCV 90   MCH 30.0   MCHC 33.2     BMP:   Recent Labs   Lab 09/19/22 1930   *      K 3.5   CL 99   CO2 26   BUN 10   CREATININE 0.8   CALCIUM 9.4       Significant Diagnostics:  I have reviewed all pertinent imaging results/findings within the past 24 hours.      Assessment/Plan:     Cholelithiasis with acute cholecystitis  72 yo female presenting with acute cholecystitis. History, exam, and imaging correlate with diagnosis. I discussed the indication for surgery, as well as risks and benefits, and she would like to proceed.    - Keep NPO  - Continue antibiotics  - Consent signed  - OR later today for laparoscopic cholecystectomy and all indicated procedures      VTE Risk Mitigation (From admission, onward)    None          Fran Owens MD  General Surgery  [place] [facility] - Emergency Dept

## 2022-09-20 NOTE — SUBJECTIVE & OBJECTIVE
No current facility-administered medications on file prior to encounter.     Current Outpatient Medications on File Prior to Encounter   Medication Sig    ascorbic acid, vitamin C, (VITAMIN C) 500 MG tablet Take 500 mg by mouth once daily.    aspirin (ECOTRIN) 81 MG EC tablet Take 81 mg by mouth once daily.    azelastine (ASTELIN) 137 mcg (0.1 %) nasal spray 1 spray (137 mcg total) by Nasal route 2 (two) times daily.    biotin 300 mcg Tab Take 1 tablet by mouth once daily.    calcium carbonate (OS-AMANDA) 500 mg calcium (1,250 mg) tablet Take 1 tablet by mouth once daily.    cetirizine (ZYRTEC) 10 MG tablet Take 10 mg by mouth once daily.    LOTEMAX SM 0.38 % DrpG Place 1 drop into the right eye 3 (three) times daily.    naproxen sodium (ANAPROX) 220 MG tablet Take 220 mg by mouth 2 (two) times daily as needed.    pravastatin (PRAVACHOL) 40 MG tablet TAKE 1 TABLET EVERY DAY (Patient taking differently: Take 40 mg by mouth every evening.)    RESTASIS 0.05 % ophthalmic emulsion Place 1 drop into both eyes 2 (two) times daily.    turmeric 400 mg Cap Take 1 capsule by mouth Daily.    vitamin D (VITAMIN D3) 1000 units Tab Take 1,000 Units by mouth once daily.    zinc sulfate (ZINCATE) 50 mg zinc (220 mg) capsule Take 220 mg by mouth once daily.    albuterol (VENTOLIN HFA) 90 mcg/actuation inhaler Inhale 2 puffs into the lungs every 6 (six) hours as needed for Shortness of Breath. Rescue       Review of patient's allergies indicates:   Allergen Reactions    Adhesive tape-silicones      Other reaction(s): Blisters       Past Medical History:   Diagnosis Date    Essential (primary) hypertension 10/27/2015    Gastro-esophageal reflux disease without esophagitis 10/27/2015    Mixed hyperlipidemia 10/27/2015    Tachyarrhythmia 9/17/2019     No past surgical history on file.  Family History    None       Tobacco Use    Smoking status: Never    Smokeless tobacco: Former   Substance and Sexual Activity    Alcohol use: Not on file     Drug use: Not on file    Sexual activity: Not on file     Review of Systems   Constitutional:  Negative for chills and fever.   Respiratory:  Negative for cough and shortness of breath.    Cardiovascular:  Negative for chest pain.   Gastrointestinal:  Positive for abdominal pain, constipation and nausea. Negative for diarrhea and vomiting.   Genitourinary:  Negative for dysuria.   Musculoskeletal:  Positive for back pain.   Neurological:  Negative for light-headedness and headaches.   Objective:     Vital Signs (Most Recent):  Temp: 99.1 °F (37.3 °C) (09/19/22 1859)  Pulse: 89 (09/20/22 0600)  Resp: 16 (09/19/22 1935)  BP: 127/61 (09/20/22 0530)  SpO2: (!) 94 % (09/20/22 0600)   Vital Signs (24h Range):  Temp:  [99.1 °F (37.3 °C)] 99.1 °F (37.3 °C)  Pulse:  [70-89] 89  Resp:  [16-18] 16  SpO2:  [92 %-98 %] 94 %  BP: (110-197)/() 127/61     Weight: 77.6 kg (171 lb)  Body mass index is 30.29 kg/m².    Physical Exam  Vitals reviewed.   Constitutional:       Appearance: Normal appearance.   Cardiovascular:      Rate and Rhythm: Normal rate.   Pulmonary:      Effort: Pulmonary effort is normal.   Abdominal:      Palpations: Abdomen is soft.      Tenderness: There is abdominal tenderness.      Comments: RUQ tenderness, otherwise non tender. No rigidity or guarding.   Skin:     General: Skin is warm and dry.   Neurological:      General: No focal deficit present.      Mental Status: She is alert and oriented to person, place, and time.       Significant Labs:  I have reviewed all pertinent lab results within the past 24 hours.  CBC:   Recent Labs   Lab 09/19/22 1930   WBC 9.10   RBC 3.97*   HGB 11.9*   HCT 35.8*      MCV 90   MCH 30.0   MCHC 33.2     BMP:   Recent Labs   Lab 09/19/22 1930   *      K 3.5   CL 99   CO2 26   BUN 10   CREATININE 0.8   CALCIUM 9.4       Significant Diagnostics:  I have reviewed all pertinent imaging results/findings within the past 24 hours.

## 2022-09-20 NOTE — PHARMACY MED REC
"  Admission Medication History     The home medication history was taken by Charisse Majano CPhT.    Medication history obtained from, Patient Verified    You may go to "Admission" then "Reconcile Home Medications" tabs to review and/or act upon these items.     The home medication list has been updated by the Pharmacy department.   Please read ALL comments highlighted in yellow.   Please address this information as you see fit.    Feel free to contact us if you have any questions or require assistance.      The medications listed below were removed from the home medication list.  Please reorder if appropriate:  Patient reports no longer taking the following medication(s):  Alprazolam 0.25 mg  Flonase 50 mcg  XYZAL 5 mg        Charisse Majano CPhT.  Ext [phone]             .        "

## 2022-09-20 NOTE — CODE 44
"MEDICARE CONDITION 44    (Reference: Transmittal 200 of the Medicare Manual)    A Medicare "Inpatient Admission" may be changed to an "Outpatient" (includes  Outpatient Observation) status, if the following conditions exist:  The change in patient status from inpatient to outpatient is made prior to        discharge or release, while the patient is still a patient in the hospital.   The hospital has not submitted a claim for the inpatient admission.  A physician concurs with the Utilization Committee decision.   Physician concurrence with the Utilization Committee's decision is documented         in the patient's records.         IMPLEMENTATION OF CONDITION CODE 44    Inpatient status has been reviewed prior to discharge. Change to Outpatient Observation status, in accordance with Condition Code 44.     By entering this in the medical record, I verify that I have reviewed and concur with the findings of the Utilization Review Committee and the Utilization Review Physician, and that the identified Patient does not meet medical necessity for Inpatient status. This patient is appropriate for the downgrade in status because upon subsequent review, it was determined that the patient did not meet the medical necessity for inpatient care.. Outpatient Observation is the identified level of care appropriate for the Patient, and all of the above conditions for this status change have been met.   "

## 2022-09-21 PROCEDURE — G0378 HOSPITAL OBSERVATION PER HR: HCPCS

## 2022-09-21 PROCEDURE — 27000221 HC OXYGEN, UP TO 24 HOURS

## 2022-09-21 PROCEDURE — 94761 N-INVAS EAR/PLS OXIMETRY MLT: CPT

## 2022-09-21 PROCEDURE — 99900035 HC TECH TIME PER 15 MIN (STAT)

## 2022-09-21 PROCEDURE — 11000001 HC ACUTE MED/SURG PRIVATE ROOM

## 2022-09-21 PROCEDURE — 25000003 PHARM REV CODE 250

## 2022-09-21 PROCEDURE — 25000003 PHARM REV CODE 250: Performed by: EMERGENCY MEDICINE

## 2022-09-21 PROCEDURE — 63600175 PHARM REV CODE 636 W HCPCS: Performed by: EMERGENCY MEDICINE

## 2022-09-21 PROCEDURE — 25000003 PHARM REV CODE 250: Performed by: STUDENT IN AN ORGANIZED HEALTH CARE EDUCATION/TRAINING PROGRAM

## 2022-09-21 PROCEDURE — 96376 TX/PRO/DX INJ SAME DRUG ADON: CPT

## 2022-09-21 PROCEDURE — 96375 TX/PRO/DX INJ NEW DRUG ADDON: CPT

## 2022-09-21 PROCEDURE — 63600175 PHARM REV CODE 636 W HCPCS: Performed by: STUDENT IN AN ORGANIZED HEALTH CARE EDUCATION/TRAINING PROGRAM

## 2022-09-21 RX ORDER — KETOROLAC TROMETHAMINE 30 MG/ML
15 INJECTION, SOLUTION INTRAMUSCULAR; INTRAVENOUS EVERY 6 HOURS
Status: DISCONTINUED | OUTPATIENT
Start: 2022-09-21 | End: 2022-09-22 | Stop reason: HOSPADM

## 2022-09-21 RX ORDER — BISACODYL 5 MG
5 TABLET, DELAYED RELEASE (ENTERIC COATED) ORAL ONCE
Status: COMPLETED | OUTPATIENT
Start: 2022-09-21 | End: 2022-09-21

## 2022-09-21 RX ORDER — POLYETHYLENE GLYCOL 3350 17 G/17G
17 POWDER, FOR SOLUTION ORAL ONCE
Status: DISCONTINUED | OUTPATIENT
Start: 2022-09-21 | End: 2022-09-21

## 2022-09-21 RX ORDER — POLYETHYLENE GLYCOL 3350 17 G/17G
17 POWDER, FOR SOLUTION ORAL ONCE
Status: COMPLETED | OUTPATIENT
Start: 2022-09-21 | End: 2022-09-21

## 2022-09-21 RX ADMIN — FAMOTIDINE 20 MG: 10 INJECTION INTRAVENOUS at 09:09

## 2022-09-21 RX ADMIN — BISACODYL 5 MG: 5 TABLET, COATED ORAL at 06:09

## 2022-09-21 RX ADMIN — FAMOTIDINE 20 MG: 10 INJECTION INTRAVENOUS at 08:09

## 2022-09-21 RX ADMIN — KETOROLAC TROMETHAMINE 15 MG: 30 INJECTION, SOLUTION INTRAMUSCULAR; INTRAVENOUS at 06:09

## 2022-09-21 RX ADMIN — MORPHINE SULFATE 4 MG: 4 INJECTION INTRAVENOUS at 10:09

## 2022-09-21 RX ADMIN — MORPHINE SULFATE 4 MG: 4 INJECTION INTRAVENOUS at 03:09

## 2022-09-21 RX ADMIN — MORPHINE SULFATE 4 MG: 4 INJECTION INTRAVENOUS at 01:09

## 2022-09-21 RX ADMIN — POLYETHYLENE GLYCOL 3350 17 G: 17 POWDER, FOR SOLUTION ORAL at 08:09

## 2022-09-21 RX ADMIN — MORPHINE SULFATE 4 MG: 4 INJECTION INTRAVENOUS at 06:09

## 2022-09-21 NOTE — ANESTHESIA POSTPROCEDURE EVALUATION
Anesthesia Post Evaluation    Patient: Karen Arango    Procedure(s) Performed: Procedure(s) (LRB):  CHOLECYSTECTOMY, LAPAROSCOPIC (N/A)    Final Anesthesia Type: general      Patient location during evaluation: floor  Patient participation: Yes- Able to Participate  Level of consciousness: awake and alert  Post-procedure vital signs: reviewed and stable  Pain management: adequate  Airway patency: patent    PONV status at discharge: No PONV  Anesthetic complications: no      Cardiovascular status: blood pressure returned to baseline and hemodynamically stable  Respiratory status: unassisted, spontaneous ventilation and room air  Hydration status: euvolemic  Follow-up not needed.          Vitals Value Taken Time   /62 09/20/22 2010   Temp 36.5 °C (97.7 °F) 09/20/22 1940   Pulse 94 09/20/22 2010   Resp 16 09/20/22 2010   SpO2 94 % 09/20/22 2010         No case tracking events are documented in the log.      Pain/Lily Score: Pain Rating Prior to Med Admin: 6 (9/20/2022  7:57 PM)  Pain Rating Post Med Admin: 6 (9/19/2022  8:45 PM)  Lily Score: 10 (9/20/2022  7:57 PM)

## 2022-09-21 NOTE — OP NOTE
DATE OF PROCEDURE:  09/20/2022    PREOPERATIVE DIAGNOSIS:  Cholecystitis.    POSTOPERATIVE DIAGNOSIS:  Cholecystitis    PROCEDURE:  Laparoscopic cholecystectomy.    SURGEON:  Saud Cooley M.D.    ASSISTANT:  Mian Owens PGY2    ANESTHESIA:  General endotracheal.    PREP:  Chlorhexidine.    SPECIMEN:  Gallbladder.    ESTIMATED BLOOD LOSS:  Minimal.    INDICATIONS:  The patient is a 71 y.o. female   who presents to ED with   symptomatic cholecystitis.  The patient was counseled on his options for   treatment and desired to proceed with surgical intervention.  The risks of the   procedure were described to the patient including bleeding, infection, pain,   scarring, wound complications, injury to local structures including the liver,   intestine or bile duct retained common duct stone and potential need for further   surgery.  The patient demonstrated understanding of these risks and a consent   form was obtained.    PROCEDURE:  The patient was identified in the Preoperative Unit and taken back   to the Operating Room and laid supine on the operating room table.  IV   antibiotics were administered prior to the induction of general anesthesia.    General anesthesia was induced without complication.  The patient was then   prepped and draped in standard sterile fashion manner.  A timeout procedure was   performed in accordance of hospital protocol.      A 5 mm incision was made in the   supraumbilical location using a #15 blade scalpel.  A 5mm optical trocar was used to enter the abdomen.  Once we confirmed an   intra-abdominal presence, CO2 insufflation was initiated.  A camera was inserted and the abdomen   was inspected.  There did not appear to be any abnormalities within the   abdomen.  At this point, an 11 mm subxiphoid trocar and two 5-mm right subcostal   trocars were then placed under direct visualization.    The gallbladder was significantly inflamed. It required aspiration before being able to grasp it.  Thin greenish material was aspirated.  The gallbladder was   grasped and elevated into the right upper quadrant over the liver.  The   peritoneum was gently stripped inferiorly until the infundibulum and cystic   structures were visualized.  Gentle dissection of the cystic duct and artery   were skeletonized and the critical view was obtained.  At this point, the cystic   artery were then clipped twice proximally, once distally and then   divided.  The gallbladder was then removed from the liver bed using Bovie   cautery.    The cystic duct was thickened to the point that a clip would not reach across it. An endoloop was placed around the cystic duct and it was divided above that.   Once this was completed, it was put into an EndoCatch bag and then   removed through the subxiphoid port.      The clips were visualized and appeared   intact.  The liver bed was hemostatic.  The right upper quadrant was gently   irrigated and fluid was evacuated.  At this point, the ports were removed under   direct visualization and CO2 gas was evacuated.  The subxiphoid fascia was closed   using 0 Vicryl suture in a figure-of-eight fashion.  The skin incisions were   closed using 5-0 Monocryl suture in an interrupted fashion.  Dry sterile   dressings were applied.  The patient was awakened from anesthesia without   complication and returned to the Postop Recovery in stable condition.  At the   end of the case, sponge and needle counts were correct on 2 occasions.  I was   present and scrubbed throughout the entirety of the case.    COMPLICATIONS:  None.    CONDITION:  Stable.

## 2022-09-21 NOTE — SUBJECTIVE & OBJECTIVE
Interval History: NAEON. Having significant pain, but appropriately tender on exam. Ambulating frequently. No flatus or BM yet. Tolerating some of her diet. AVSS.    Medications:  Continuous Infusions:  Scheduled Meds:   bisacodyL  5 mg Oral Once    famotidine (PF)  20 mg Intravenous Q12H    polyethylene glycol  17 g Oral Once     PRN Meds:HYDROcodone-acetaminophen, melatonin, metoclopramide HCl, morphine, morphine, ondansetron, prochlorperazine, sodium chloride 0.9%, sodium chloride 0.9%, sodium chloride 0.9%     Review of patient's allergies indicates:   Allergen Reactions    Adhesive tape-silicones      Other reaction(s): Blisters     Objective:     Vital Signs (Most Recent):  Temp: 97.5 °F (36.4 °C) (09/21/22 1602)  Pulse: 85 (09/21/22 1602)  Resp: 18 (09/21/22 1602)  BP: 122/68 (09/21/22 1602)  SpO2: (!) 93 % (09/21/22 1631)   Vital Signs (24h Range):  Temp:  [96.6 °F (35.9 °C)-98.3 °F (36.8 °C)] 97.5 °F (36.4 °C)  Pulse:  [73-95] 85  Resp:  [16-20] 18  SpO2:  [91 %-99 %] 93 %  BP: (119-145)/(58-81) 122/68     Weight: 78.2 kg (172 lb 6.4 oz)  Body mass index is 30.54 kg/m².    Intake/Output - Last 3 Shifts         09/19 0700 09/20 0659 09/20 0700 09/21 0659 09/21 0700 09/22 0659    P.O.  240     IV Piggyback  850     Total Intake(mL/kg)  1090 (13.9)     Urine (mL/kg/hr)  650 (0.3)     Total Output  650     Net  +440                    Physical Exam  Vitals reviewed.   Constitutional:       Appearance: Normal appearance.   Cardiovascular:      Rate and Rhythm: Normal rate.   Pulmonary:      Effort: Pulmonary effort is normal.   Abdominal:      General: Abdomen is flat.      Palpations: Abdomen is soft.      Comments: Appropriately tender to palpation. Incisions healing well with dermabond intact   Skin:     General: Skin is warm and dry.   Neurological:      General: No focal deficit present.      Mental Status: She is alert and oriented to person, place, and time.       Significant Labs:  I have reviewed all  pertinent lab results within the past 24 hours.  CBC:   Recent Labs   Lab 09/19/22 1930   WBC 9.10   RBC 3.97*   HGB 11.9*   HCT 35.8*      MCV 90   MCH 30.0   MCHC 33.2     BMP:   Recent Labs   Lab 09/19/22 1930   *      K 3.5   CL 99   CO2 26   BUN 10   CREATININE 0.8   CALCIUM 9.4       Significant Diagnostics:  I have reviewed all pertinent imaging results/findings within the past 24 hours.

## 2022-09-21 NOTE — PLAN OF CARE
Problem: Fall Injury Risk  Goal: Absence of Fall and Fall-Related Injury  Outcome: Ongoing, Progressing     Problem: Bleeding (Surgery Nonspecified)  Goal: Absence of Bleeding  Outcome: Ongoing, Progressing     Problem: Infection (Surgery Nonspecified)  Goal: Absence of Infection Signs and Symptoms  Outcome: Ongoing, Progressing     Problem: Pain (Surgery Nonspecified)  Goal: Acceptable Pain Control  Outcome: Ongoing, Progressing

## 2022-09-21 NOTE — TRANSFER OF CARE
"Anesthesia Transfer of Care Note    Patient: Karen Arango    Procedure(s) Performed: Procedure(s) (LRB):  CHOLECYSTECTOMY, LAPAROSCOPIC (N/A)    Patient location: [facility] Surgical Floor    Anesthesia Type: general    Transport from OR: Transported from OR on 6-10 L/min O2 by face mask with adequate spontaneous ventilation    Post pain: adequate analgesia    Post assessment: no apparent anesthetic complications and tolerated procedure well    Post vital signs: stable    Level of consciousness: awake and alert    Nausea/Vomiting: no nausea/vomiting    Complications: none    Transfer of care protocol was followed      Last vitals:   Visit Vitals  BP (!) 144/80 (BP Location: Left arm, Patient Position: Lying)   Pulse 75   Temp 36.8 °C (98.3 °F) (Oral)   Resp 18   Ht 5' 3" (1.6 m)   Wt 78.2 kg (172 lb 6.4 oz)   SpO2 97%   BMI 30.54 kg/m²     "

## 2022-09-21 NOTE — ASSESSMENT & PLAN NOTE
72 yo female admitted with acute cholecystitis, now s/p laparoscopic cholecystectomy on 9/20. Still having pain and not passing flatus yet.     - Bowel regimen added  - Continue to encourage frequent activity as tolerated  - Regular diet  - We will keep another night and potentially discharge tomorrow if doing well

## 2022-09-21 NOTE — PLAN OF CARE
SW met with pt this AM to complete DCA. Pt stated that her pain is an 8, and that she is trying to pass gas. Pt reported that she lives along, but her daughter Aleja [phone] is on the way up to see her at the hospital. Pt reported no HME at home and that she is independent in her ALD's. SW requested f/u appts. White board updated with CM name and contact information.  Discharge brochure provided.  Pt encouraged to call with any questions or concerns.  Cm will continue to follow pt through transitions of care and assist with any discharge needs.    Alvarez Ramirez, MSSHA  [phone]    Future Appointments   Date Time Provider Department Center   10/5/2022  2:40 PM Saud Cooley MD [facility]        09/21/22 0958   Discharge Assessment   Assessment Type Discharge Planning Assessment   Confirmed/corrected address, phone number and insurance Yes   Confirmed Demographics Correct on Facesheet   Source of Information patient   When was your last doctors appointment?   (per pt in march)   Does patient/caregiver understand observation status Yes   Reason For Admission Cholelithiasis with acute cholecystitis   Lives With alone   Facility Arrived From: home   Do you expect to return to your current living situation? Yes   Do you have help at home or someone to help you manage your care at home? Yes   Who are your caregiver(s) and their phone number(s)? Daughter Aleja [phone]   Prior to hospitilization cognitive status: Alert/Oriented   Current cognitive status: Alert/Oriented   Walking or Climbing Stairs Difficulty none   Dressing/Bathing Difficulty none   Home Accessibility wheelchair accessible   Home Layout Able to live on 1st floor   Equipment Currently Used at Home none   Readmission within 30 days? No   Patient currently being followed by outpatient case management? No   Do you currently have service(s) that help you manage your care at home? No   Do you take prescription medications? Yes    Do you have prescription coverage? Yes   Coverage [employer]   Do you have any problems affording any of your prescribed medications? No   Is the patient taking medications as prescribed? yes   Who is going to help you get home at discharge? Daughter Aleja [phone]   How do you get to doctors appointments? car, drives self   Are you on dialysis? No   Do you take coumadin? No   Discharge Plan A Home   DME Needed Upon Discharge  none   Discharge Plan discussed with: Patient   Discharge Barriers Identified None   Physical Activity   On average, how many days per week do you engage in moderate to strenuous exercise (like a brisk walk)? 2 days   On average, how many minutes do you engage in exercise at this level? 20 min   Financial Resource Strain   How hard is it for you to pay for the very basics like food, housing, medical care, and heating? Not hard   Housing Stability   In the last 12 months, was there a time when you were not able to pay the mortgage or rent on time? N   In the last 12 months, was there a time when you did not have a steady place to sleep or slept in a shelter (including now)? N   Transportation Needs   In the past 12 months, has lack of transportation kept you from medical appointments or from getting medications? no   In the past 12 months, has lack of transportation kept you from meetings, work, or from getting things needed for daily living? No   Food Insecurity   Within the past 12 months, you worried that your food would run out before you got the money to buy more. Never true   Within the past 12 months, the food you bought just didn't last and you didn't have money to get more. Never true   Stress   Do you feel stress - tense, restless, nervous, or anxious, or unable to sleep at night because your mind is troubled all the time - these days? Only a littl   Social Connections   In a typical week, how many times do you talk on the phone with family, friends, or neighbors? Three   How  often do you get together with friends or relatives? Three times   How often do you attend Faith or Tenriism services? Patient refu   Do you belong to any clubs or organizations such as Faith groups, unions, fraternal or athletic groups, or school groups? Patient refu   How often do you attend meetings of the clubs or organizations you belong to? Patient refu   Are you , , , , never , or living with a partner?    Alcohol Use   Q1: How often do you have a drink containing alcohol? Monthly or l   Q2: How many drinks containing alcohol do you have on a typical day when you are drinking? 1 or 2   Q3: How often do you have six or more drinks on one occasion? Less than mo

## 2022-09-21 NOTE — PROGRESS NOTES
[facility]  General Surgery  Progress Note    Subjective:     History of Present Illness:  Ms. Arango is a 70 yo female with PMH including HTN and GERD who presents with RUQ abdominal pain with radiation to the back since yesterday. The pain has been severe and associated with nausea, but no emesis. No fever or chills. Endorses constipation, but no diarrhea. Workup in the ED demonstrated normal lab work, with RUQ demonstrating: Sonographic findings of cholelithiasis and cholecystitis.      Post-Op Info:  Procedure(s) (LRB):  CHOLECYSTECTOMY, LAPAROSCOPIC (N/A)   1 Day Post-Op     Interval History: NAEON. Having significant pain, but appropriately tender on exam. Ambulating frequently. No flatus or BM yet. Tolerating some of her diet. AVSS.    Medications:  Continuous Infusions:  Scheduled Meds:   bisacodyL  5 mg Oral Once    famotidine (PF)  20 mg Intravenous Q12H    polyethylene glycol  17 g Oral Once     PRN Meds:HYDROcodone-acetaminophen, melatonin, metoclopramide HCl, morphine, morphine, ondansetron, prochlorperazine, sodium chloride 0.9%, sodium chloride 0.9%, sodium chloride 0.9%     Review of patient's allergies indicates:   Allergen Reactions    Adhesive tape-silicones      Other reaction(s): Blisters     Objective:     Vital Signs (Most Recent):  Temp: 97.5 °F (36.4 °C) (09/21/22 1602)  Pulse: 85 (09/21/22 1602)  Resp: 18 (09/21/22 1602)  BP: 122/68 (09/21/22 1602)  SpO2: (!) 93 % (09/21/22 1631)   Vital Signs (24h Range):  Temp:  [96.6 °F (35.9 °C)-98.3 °F (36.8 °C)] 97.5 °F (36.4 °C)  Pulse:  [73-95] 85  Resp:  [16-20] 18  SpO2:  [91 %-99 %] 93 %  BP: (119-145)/(58-81) 122/68     Weight: 78.2 kg (172 lb 6.4 oz)  Body mass index is 30.54 kg/m².    Intake/Output - Last 3 Shifts         09/19 0700  09/20 0659 09/20 0700 09/21 0659 09/21 0700 09/22 0659    P.O.  240     IV Piggyback  850     Total Intake(mL/kg)  1090 (13.9)     Urine (mL/kg/hr)  650 (0.3)     Total Output  650     Net  +440                     Physical Exam  Vitals reviewed.   Constitutional:       Appearance: Normal appearance.   Cardiovascular:      Rate and Rhythm: Normal rate.   Pulmonary:      Effort: Pulmonary effort is normal.   Abdominal:      General: Abdomen is flat.      Palpations: Abdomen is soft.      Comments: Appropriately tender to palpation. Incisions healing well with dermabond intact   Skin:     General: Skin is warm and dry.   Neurological:      General: No focal deficit present.      Mental Status: She is alert and oriented to person, place, and time.       Significant Labs:  I have reviewed all pertinent lab results within the past 24 hours.  CBC:   Recent Labs   Lab 09/19/22 1930   WBC 9.10   RBC 3.97*   HGB 11.9*   HCT 35.8*      MCV 90   MCH 30.0   MCHC 33.2     BMP:   Recent Labs   Lab 09/19/22 1930   *      K 3.5   CL 99   CO2 26   BUN 10   CREATININE 0.8   CALCIUM 9.4       Significant Diagnostics:  I have reviewed all pertinent imaging results/findings within the past 24 hours.    Assessment/Plan:     * Cholelithiasis with acute cholecystitis  70 yo female admitted with acute cholecystitis, now s/p laparoscopic cholecystectomy on 9/20. Still having pain and not passing flatus yet.     - Bowel regimen added  - Continue to encourage frequent activity as tolerated  - Regular diet  - We will keep another night and potentially discharge tomorrow if doing well        Fran Owens MD  General Surgery  [place] - Telemetry

## 2022-09-22 VITALS
WEIGHT: 172.38 LBS | HEIGHT: 63 IN | HEART RATE: 78 BPM | SYSTOLIC BLOOD PRESSURE: 134 MMHG | TEMPERATURE: 99 F | OXYGEN SATURATION: 98 % | BODY MASS INDEX: 30.54 KG/M2 | DIASTOLIC BLOOD PRESSURE: 69 MMHG | RESPIRATION RATE: 18 BRPM

## 2022-09-22 PROBLEM — K80.00 CHOLELITHIASIS WITH ACUTE CHOLECYSTITIS: Status: RESOLVED | Noted: 2022-09-20 | Resolved: 2022-09-22

## 2022-09-22 PROBLEM — K81.9 CHOLECYSTITIS: Status: RESOLVED | Noted: 2022-09-20 | Resolved: 2022-09-22

## 2022-09-22 PROCEDURE — 99900035 HC TECH TIME PER 15 MIN (STAT)

## 2022-09-22 PROCEDURE — 96376 TX/PRO/DX INJ SAME DRUG ADON: CPT

## 2022-09-22 PROCEDURE — 63600175 PHARM REV CODE 636 W HCPCS: Performed by: STUDENT IN AN ORGANIZED HEALTH CARE EDUCATION/TRAINING PROGRAM

## 2022-09-22 PROCEDURE — 25000003 PHARM REV CODE 250: Performed by: EMERGENCY MEDICINE

## 2022-09-22 PROCEDURE — G0378 HOSPITAL OBSERVATION PER HR: HCPCS

## 2022-09-22 PROCEDURE — 25000003 PHARM REV CODE 250

## 2022-09-22 PROCEDURE — 94761 N-INVAS EAR/PLS OXIMETRY MLT: CPT

## 2022-09-22 RX ORDER — AMOXICILLIN 250 MG
1 CAPSULE ORAL ONCE
Status: COMPLETED | OUTPATIENT
Start: 2022-09-22 | End: 2022-09-22

## 2022-09-22 RX ORDER — OXYCODONE AND ACETAMINOPHEN 5; 325 MG/1; MG/1
1 TABLET ORAL EVERY 4 HOURS PRN
Qty: 20 TABLET | Refills: 0 | Status: SHIPPED | OUTPATIENT
Start: 2022-09-22 | End: 2023-06-13

## 2022-09-22 RX ORDER — FAMOTIDINE 20 MG/1
20 TABLET, FILM COATED ORAL 2 TIMES DAILY
Status: DISCONTINUED | OUTPATIENT
Start: 2022-09-22 | End: 2022-09-22 | Stop reason: HOSPADM

## 2022-09-22 RX ORDER — POLYETHYLENE GLYCOL 3350 17 G/17G
17 POWDER, FOR SOLUTION ORAL DAILY
Status: DISCONTINUED | OUTPATIENT
Start: 2022-09-22 | End: 2022-09-22 | Stop reason: HOSPADM

## 2022-09-22 RX ADMIN — KETOROLAC TROMETHAMINE 15 MG: 30 INJECTION, SOLUTION INTRAMUSCULAR; INTRAVENOUS at 12:09

## 2022-09-22 RX ADMIN — DOCUSATE SODIUM AND SENNOSIDES 1 TABLET: 8.6; 5 TABLET, FILM COATED ORAL at 08:09

## 2022-09-22 RX ADMIN — POLYETHYLENE GLYCOL 3350 17 G: 17 POWDER, FOR SOLUTION ORAL at 08:09

## 2022-09-22 RX ADMIN — FAMOTIDINE 20 MG: 10 INJECTION INTRAVENOUS at 08:09

## 2022-09-22 RX ADMIN — KETOROLAC TROMETHAMINE 15 MG: 30 INJECTION, SOLUTION INTRAMUSCULAR; INTRAVENOUS at 06:09

## 2022-09-22 NOTE — SUBJECTIVE & OBJECTIVE
Interval History: NAEON. Feeling better this morning. Pain better controlled. Tolerating diet. AVSS.     Medications:  Continuous Infusions:  Scheduled Meds:   famotidine (PF)  20 mg Intravenous Q12H    ketorolac  15 mg Intravenous Q6H    polyethylene glycol  17 g Oral Daily     PRN Meds:HYDROcodone-acetaminophen, melatonin, metoclopramide HCl, morphine, morphine, ondansetron, prochlorperazine, sodium chloride 0.9%, sodium chloride 0.9%, sodium chloride 0.9%     Review of patient's allergies indicates:   Allergen Reactions    Adhesive tape-silicones      Other reaction(s): Blisters     Objective:     Vital Signs (Most Recent):  Temp: 98.4 °F (36.9 °C) (09/22/22 0747)  Pulse: 74 (09/22/22 0747)  Resp: 18 (09/22/22 0747)  BP: 136/65 (09/22/22 0747)  SpO2: (!) 94 % (09/22/22 0747)   Vital Signs (24h Range):  Temp:  [96.7 °F (35.9 °C)-98.8 °F (37.1 °C)] 98.4 °F (36.9 °C)  Pulse:  [72-92] 74  Resp:  [16-20] 18  SpO2:  [91 %-98 %] 94 %  BP: (119-154)/(63-81) 136/65     Weight: 78.2 kg (172 lb 6.4 oz)  Body mass index is 30.54 kg/m².    Intake/Output - Last 3 Shifts         09/20 0700 09/21 0659 09/21 0700 09/22 0659 09/22 0700 09/23 0659    P.O. 240      IV Piggyback 850      Total Intake(mL/kg) 1090 (13.9)      Urine (mL/kg/hr) 650 (0.3) 1700 (0.9)     Total Output 650 1700     Net +440 -1700                    Physical Exam  Vitals reviewed.   Constitutional:       Appearance: Normal appearance.   Cardiovascular:      Rate and Rhythm: Normal rate.   Pulmonary:      Effort: Pulmonary effort is normal.   Abdominal:      General: Abdomen is flat.      Palpations: Abdomen is soft.      Comments: Appropriately tender, incisions healing well   Skin:     General: Skin is warm and dry.   Neurological:      General: No focal deficit present.      Mental Status: She is alert and oriented to person, place, and time.       Significant Labs:  I have reviewed all pertinent lab results within the past 24 hours.  CBC:   Recent Labs    Lab 09/19/22 1930   WBC 9.10   RBC 3.97*   HGB 11.9*   HCT 35.8*      MCV 90   MCH 30.0   MCHC 33.2     BMP:   Recent Labs   Lab 09/19/22 1930   *      K 3.5   CL 99   CO2 26   BUN 10   CREATININE 0.8   CALCIUM 9.4       Significant Diagnostics:  I have reviewed all pertinent imaging results/findings within the past 24 hours.

## 2022-09-22 NOTE — NURSING
Notified Dr. Ratliff abdomen slightly distended compared to last night. Patient also complain of pain 7/10. Incisions CDI. No new order received. Will continue to monitor.

## 2022-09-22 NOTE — PROGRESS NOTES
Pharmacist Intervention IV to PO Note    Karen Arango is a 71 y.o. female being treated with IV medication famotidine    Patient Data:    Vital Signs (Most Recent):  Temp: 98.5 °F (36.9 °C) (09/22/22 1153)  Pulse: 83 (09/22/22 1153)  Resp: 18 (09/22/22 1153)  BP: 134/69 (09/22/22 1153)  SpO2: (!) 92 % (09/22/22 1153)   Vital Signs (72h Range):  Temp:  [96.6 °F (35.9 °C)-99.1 °F (37.3 °C)]   Pulse:  [68-95]   Resp:  [16-20]   BP: (110-197)/()   SpO2:  [91 %-99 %]      CBC:  Recent Labs   Lab 09/19/22 1930   WBC 9.10   RBC 3.97*   HGB 11.9*   HCT 35.8*      MCV 90   MCH 30.0   MCHC 33.2     CMP:     Recent Labs   Lab 09/19/22 1930   *   CALCIUM 9.4   ALBUMIN 3.8   PROT 7.2      K 3.5   CO2 26   CL 99   BUN 10   CREATININE 0.8   ALKPHOS 78   ALT 14   AST 13   BILITOT 0.4       Dietary Orders:  Diet Orders            Diet Adult Regular (IDDSI Level 7): Regular starting at 09/20 2048            Based on the following criteria, this patient qualifies for intravenous to oral conversion:  [x] The patients gastrointestinal tract is functioning (tolerating medications via oral or enteral route for 24 hours and tolerating food or enteral feeds for 24 hours).  [x] The patient is hemodynamically stable for 24 hours (heart rate <100 beats per minute, systolic blood pressure >99 mm Hg, and respiratory rate <20 breaths per minute).  [x] The patient shows clinical improvement (afebrile for at least 24 hours and white blood cell count downtrending or normalized). Additionally, the patient must be non-neutropenic (absolute neutrophil count >500 cells/mm3).  [x] For antimicrobials, the patient has received IV therapy for at least 24 hours.    IV medication famotidine will be changed to oral medication     Pharmacist's Name: Po Manley  Pharmacist's Extension: [phone]

## 2022-09-22 NOTE — PLAN OF CARE
Problem: Fall Injury Risk  Goal: Absence of Fall and Fall-Related Injury  Outcome: Ongoing, Progressing     Problem: Bleeding (Surgery Nonspecified)  Goal: Absence of Bleeding  Outcome: Ongoing, Progressing     Problem: Infection (Cholecystectomy)  Goal: Absence of Infection Signs and Symptoms  Outcome: Ongoing, Progressing     Problem: Pain (Cholecystectomy)  Goal: Acceptable Pain Control  Outcome: Ongoing, Progressing

## 2022-09-22 NOTE — PROGRESS NOTES
[facility]  General Surgery  Progress Note    Subjective:     History of Present Illness:  Ms. Arango is a 72 yo female with PMH including HTN and GERD who presents with RUQ abdominal pain with radiation to the back since yesterday. The pain has been severe and associated with nausea, but no emesis. No fever or chills. Endorses constipation, but no diarrhea. Workup in the ED demonstrated normal lab work, with RUQ demonstrating: Sonographic findings of cholelithiasis and cholecystitis.      Post-Op Info:  Procedure(s) (LRB):  CHOLECYSTECTOMY, LAPAROSCOPIC (N/A)   2 Days Post-Op     Interval History: NAEON. Feeling better this morning. Pain better controlled. Tolerating diet. AVSS.     Medications:  Continuous Infusions:  Scheduled Meds:   famotidine (PF)  20 mg Intravenous Q12H    ketorolac  15 mg Intravenous Q6H    polyethylene glycol  17 g Oral Daily     PRN Meds:HYDROcodone-acetaminophen, melatonin, metoclopramide HCl, morphine, morphine, ondansetron, prochlorperazine, sodium chloride 0.9%, sodium chloride 0.9%, sodium chloride 0.9%     Review of patient's allergies indicates:   Allergen Reactions    Adhesive tape-silicones      Other reaction(s): Blisters     Objective:     Vital Signs (Most Recent):  Temp: 98.4 °F (36.9 °C) (09/22/22 0747)  Pulse: 74 (09/22/22 0747)  Resp: 18 (09/22/22 0747)  BP: 136/65 (09/22/22 0747)  SpO2: (!) 94 % (09/22/22 0747)   Vital Signs (24h Range):  Temp:  [96.7 °F (35.9 °C)-98.8 °F (37.1 °C)] 98.4 °F (36.9 °C)  Pulse:  [72-92] 74  Resp:  [16-20] 18  SpO2:  [91 %-98 %] 94 %  BP: (119-154)/(63-81) 136/65     Weight: 78.2 kg (172 lb 6.4 oz)  Body mass index is 30.54 kg/m².    Intake/Output - Last 3 Shifts         09/20 0700  09/21 0659 09/21 0700  09/22 0659 09/22 0700  09/23 0659    P.O. 240      IV Piggyback 850      Total Intake(mL/kg) 1090 (13.9)      Urine (mL/kg/hr) 650 (0.3) 1700 (0.9)     Total Output 650 1700     Net +440 -1700                    Physical  Exam  Vitals reviewed.   Constitutional:       Appearance: Normal appearance.   Cardiovascular:      Rate and Rhythm: Normal rate.   Pulmonary:      Effort: Pulmonary effort is normal.   Abdominal:      General: Abdomen is flat.      Palpations: Abdomen is soft.      Comments: Appropriately tender, incisions healing well   Skin:     General: Skin is warm and dry.   Neurological:      General: No focal deficit present.      Mental Status: She is alert and oriented to person, place, and time.       Significant Labs:  I have reviewed all pertinent lab results within the past 24 hours.  CBC:   Recent Labs   Lab 09/19/22 1930   WBC 9.10   RBC 3.97*   HGB 11.9*   HCT 35.8*      MCV 90   MCH 30.0   MCHC 33.2     BMP:   Recent Labs   Lab 09/19/22 1930   *      K 3.5   CL 99   CO2 26   BUN 10   CREATININE 0.8   CALCIUM 9.4       Significant Diagnostics:  I have reviewed all pertinent imaging results/findings within the past 24 hours.    Assessment/Plan:     * Cholelithiasis with acute cholecystitis  70 yo female admitted with acute cholecystitis, now s/p laparoscopic cholecystectomy on 9/20. Passing flatus now. Tolerating diet with pain better controlled.     - Continue bowel regimen  - Continue to encourage frequent activity as tolerated  - Regular diet  - Discharge later today        Fran Owens MD  General Surgery  [place] - Telemetry

## 2022-09-22 NOTE — PLAN OF CARE
Sw met with pt and pt's boyfriend at bedside to complete final assessment. Pt will have boyfriend help transport pt home later today. Pt has f/u appts listed on avs. Rounds completed on pt.  All questions addressed.  Bedside nurse to discuss d/c medications.  Discussed importance to attend all f/u appts and take medications as prescribed.  Verbalized understanding.    AlvarezHAYLEE Huerta  [phone]    Future Appointments   Date Time Provider Department Center   9/28/2022 11:00 AM Shade Bustos MD [facility]   10/5/2022  2:40 PM Saud Cooley MD [facility]        09/22/22 1612   Final Note   Assessment Type Final Discharge Note   Anticipated Discharge Disposition Home   What phone number can be called within the next 1-3 days to see how you are doing after discharge? [phone]   Hospital Resources/Appts/Education Provided Appointments scheduled and added to AVS   Post-Acute Status   Coverage [employer]   Discharge Delays None known at this time

## 2022-09-22 NOTE — DISCHARGE SUMMARY
[facility]  General Surgery  Discharge Summary      Patient Name: Karen Arango  MRN: [id number]  Admission Date: 9/19/2022  Hospital Length of Stay: 1 days  Discharge Date and Time:  09/22/2022 3:44 PM  Attending Physician: Saud Cooley MD   Discharging Provider: Fran Owens MD  Primary Care Provider: Shade Bustos MD    HPI:   Ms. Arango is a 70 yo female with PMH including HTN and GERD who presents with RUQ abdominal pain with radiation to the back since yesterday. The pain has been severe and associated with nausea, but no emesis. No fever or chills. Endorses constipation, but no diarrhea. Workup in the ED demonstrated normal lab work, with RUQ demonstrating: Sonographic findings of cholelithiasis and cholecystitis.      Procedure(s) (LRB):  CHOLECYSTECTOMY, LAPAROSCOPIC (N/A)      Indwelling Lines/Drains at time of discharge:   Lines/Drains/Airways     None               Hospital Course: Ms. Arango underwent laparoscopic cholecystectomy on 9/20. She tolerated the procedure well without complication. By 9/22 she was tolerating a diet, active, and medically stable for discharge.      Goals of Care Treatment Preferences:  Code Status: Full Code        Pending Diagnostic Studies:     Procedure Component Value Units Date/Time    Specimen to Pathology, Surgery General Surgery [[id number]] Collected: 09/20/22 1853    Order Status: Sent Lab Status: In process Updated: 09/21/22 0834    Specimen: Tissue         Final Active Diagnoses:      Problems Resolved During this Admission:    Diagnosis Date Noted Date Resolved POA    PRINCIPAL PROBLEM:  Cholelithiasis with acute cholecystitis [K80.00] 09/20/2022 09/22/2022 Yes    Cholecystitis [K81.9] 09/20/2022 09/22/2022 Yes      Discharged Condition: good    Disposition: Home or Self Care    Follow Up:   Follow-up Information     Saud Cooley MD Follow up in 2 week(s).    Specialties: Surgery, General Surgery  Contact information:  [address]  [facility]  [address]  [place] [address]  [phone]                       Patient Instructions:      Diet Adult Regular     Lifting restrictions   Order Comments: No more than 15 pounds     Notify your health care provider if you experience any of the following:  temperature >100.4     Notify your health care provider if you experience any of the following:  persistent nausea and vomiting or diarrhea     Notify your health care provider if you experience any of the following:  severe uncontrolled pain     Notify your health care provider if you experience any of the following:  redness, tenderness, or signs of infection (pain, swelling, redness, odor or green/yellow discharge around incision site)     Notify your health care provider if you experience any of the following:  difficulty breathing or increased cough     Notify your health care provider if you experience any of the following:  severe persistent headache     Notify your health care provider if you experience any of the following:  worsening rash     Notify your health care provider if you experience any of the following:  persistent dizziness, light-headedness, or visual disturbances     Notify your health care provider if you experience any of the following:  increased confusion or weakness     No dressing needed     Activity as tolerated     Medications:  Reconciled Home Medications:      Medication List      START taking these medications    oxyCODONE-acetaminophen 5-325 mg per tablet  Commonly known as: PERCOCET  Take 1 tablet by mouth every 4 (four) hours as needed for Pain.        CHANGE how you take these medications    pravastatin 40 MG tablet  Commonly known as: PRAVACHOL  TAKE 1 TABLET EVERY DAY  What changed: when to take this        CONTINUE taking these medications    albuterol 90 mcg/actuation inhaler  Commonly known as: VENTOLIN HFA  Inhale 2 puffs into the lungs every 6 (six) hours as needed for Shortness of Breath. Rescue     aspirin 81 MG EC  tablet  Commonly known as: ECOTRIN  Take 81 mg by mouth once daily.     azelastine 137 mcg (0.1 %) nasal spray  Commonly known as: ASTELIN  1 spray (137 mcg total) by Nasal route 2 (two) times daily.     biotin 300 mcg Tab  Take 1 tablet by mouth once daily.     calcium carbonate 500 mg calcium (1,250 mg) tablet  Commonly known as: OS-AMANDA  Take 1 tablet by mouth once daily.     cetirizine 10 MG tablet  Commonly known as: ZYRTEC  Take 10 mg by mouth once daily.     LOTEMAX SM 0.38 % Drpg  Generic drug: loteprednol etabonate  Place 1 drop into the right eye 3 (three) times daily.     naproxen sodium 220 MG tablet  Commonly known as: ANAPROX  Take 220 mg by mouth 2 (two) times daily as needed.     RESTASIS 0.05 % ophthalmic emulsion  Generic drug: cycloSPORINE  Place 1 drop into both eyes 2 (two) times daily.     turmeric 400 mg Cap  Take 1 capsule by mouth Daily.     VITAMIN C 500 MG tablet  Generic drug: ascorbic acid (vitamin C)  Take 500 mg by mouth once daily.     vitamin D 1000 units Tab  Commonly known as: VITAMIN D3  Take 1,000 Units by mouth once daily.     zinc sulfate 50 mg zinc (220 mg) capsule  Commonly known as: ZINCATE  Take 220 mg by mouth once daily.          Time spent on the discharge of patient: 10 minutes    Fran Owens MD  General Surgery  [place] [facility]

## 2022-09-22 NOTE — HOSPITAL COURSE
Ms. Arango underwent laparoscopic cholecystectomy on 9/20. She tolerated the procedure well without complication. By 9/22 she was tolerating a diet, active, and medically stable for discharge.

## 2022-09-22 NOTE — PLAN OF CARE
Discharge orders noted. Additional clinical references attached. Patient's discharge instructions given by bedside RN and reviewed by this VN with patient. SO at bedside. Education provided on home care instructions, new and previous medications, diagnosis, when to seek medical attention, and follow-up appointments. New medication to be delivered by pharmacy. Patient verbalized understanding and teach back method was used. Patient's SO to provide ride/transportation home. All questions answered. Waiting for bedside delivery. Floor nurse notified.

## 2022-09-22 NOTE — ASSESSMENT & PLAN NOTE
70 yo female admitted with acute cholecystitis, now s/p laparoscopic cholecystectomy on 9/20. Passing flatus now. Tolerating diet with pain better controlled.     - Continue bowel regimen  - Continue to encourage frequent activity as tolerated  - Regular diet  - Discharge later today

## 2022-09-26 LAB
FINAL PATHOLOGIC DIAGNOSIS: NORMAL
GROSS: NORMAL
Lab: NORMAL

## 2022-10-10 ENCOUNTER — OFFICE VISIT (OUTPATIENT)
Dept: SURGERY | Facility: CLINIC | Age: 72
End: 2022-10-10
Payer: MEDICARE

## 2022-10-10 VITALS
HEART RATE: 78 BPM | WEIGHT: 171.75 LBS | BODY MASS INDEX: 30.43 KG/M2 | DIASTOLIC BLOOD PRESSURE: 81 MMHG | HEIGHT: 63 IN | SYSTOLIC BLOOD PRESSURE: 132 MMHG

## 2022-10-10 DIAGNOSIS — Z90.49 S/P LAPAROSCOPIC CHOLECYSTECTOMY: Primary | ICD-10-CM

## 2022-10-10 PROCEDURE — 1126F PR PAIN SEVERITY QUANTIFIED, NO PAIN PRESENT: ICD-10-PCS | Mod: CPTII,S$GLB,, | Performed by: STUDENT IN AN ORGANIZED HEALTH CARE EDUCATION/TRAINING PROGRAM

## 2022-10-10 PROCEDURE — 99024 PR POST-OP FOLLOW-UP VISIT: ICD-10-PCS | Mod: S$GLB,,, | Performed by: STUDENT IN AN ORGANIZED HEALTH CARE EDUCATION/TRAINING PROGRAM

## 2022-10-10 PROCEDURE — 99999 PR PBB SHADOW E&M-EST. PATIENT-LVL III: CPT | Mod: PBBFAC,,, | Performed by: STUDENT IN AN ORGANIZED HEALTH CARE EDUCATION/TRAINING PROGRAM

## 2022-10-10 PROCEDURE — 1159F MED LIST DOCD IN RCRD: CPT | Mod: CPTII,S$GLB,, | Performed by: STUDENT IN AN ORGANIZED HEALTH CARE EDUCATION/TRAINING PROGRAM

## 2022-10-10 PROCEDURE — 3008F PR BODY MASS INDEX (BMI) DOCUMENTED: ICD-10-PCS | Mod: CPTII,S$GLB,, | Performed by: STUDENT IN AN ORGANIZED HEALTH CARE EDUCATION/TRAINING PROGRAM

## 2022-10-10 PROCEDURE — 1160F RVW MEDS BY RX/DR IN RCRD: CPT | Mod: CPTII,S$GLB,, | Performed by: STUDENT IN AN ORGANIZED HEALTH CARE EDUCATION/TRAINING PROGRAM

## 2022-10-10 PROCEDURE — 1160F PR REVIEW ALL MEDS BY PRESCRIBER/CLIN PHARMACIST DOCUMENTED: ICD-10-PCS | Mod: CPTII,S$GLB,, | Performed by: STUDENT IN AN ORGANIZED HEALTH CARE EDUCATION/TRAINING PROGRAM

## 2022-10-10 PROCEDURE — 3075F PR MOST RECENT SYSTOLIC BLOOD PRESS GE 130-139MM HG: ICD-10-PCS | Mod: CPTII,S$GLB,, | Performed by: STUDENT IN AN ORGANIZED HEALTH CARE EDUCATION/TRAINING PROGRAM

## 2022-10-10 PROCEDURE — 1159F PR MEDICATION LIST DOCUMENTED IN MEDICAL RECORD: ICD-10-PCS | Mod: CPTII,S$GLB,, | Performed by: STUDENT IN AN ORGANIZED HEALTH CARE EDUCATION/TRAINING PROGRAM

## 2022-10-10 PROCEDURE — 1101F PT FALLS ASSESS-DOCD LE1/YR: CPT | Mod: CPTII,S$GLB,, | Performed by: STUDENT IN AN ORGANIZED HEALTH CARE EDUCATION/TRAINING PROGRAM

## 2022-10-10 PROCEDURE — 99024 POSTOP FOLLOW-UP VISIT: CPT | Mod: S$GLB,,, | Performed by: STUDENT IN AN ORGANIZED HEALTH CARE EDUCATION/TRAINING PROGRAM

## 2022-10-10 PROCEDURE — 3079F DIAST BP 80-89 MM HG: CPT | Mod: CPTII,S$GLB,, | Performed by: STUDENT IN AN ORGANIZED HEALTH CARE EDUCATION/TRAINING PROGRAM

## 2022-10-10 PROCEDURE — 1126F AMNT PAIN NOTED NONE PRSNT: CPT | Mod: CPTII,S$GLB,, | Performed by: STUDENT IN AN ORGANIZED HEALTH CARE EDUCATION/TRAINING PROGRAM

## 2022-10-10 PROCEDURE — 99999 PR PBB SHADOW E&M-EST. PATIENT-LVL III: ICD-10-PCS | Mod: PBBFAC,,, | Performed by: STUDENT IN AN ORGANIZED HEALTH CARE EDUCATION/TRAINING PROGRAM

## 2022-10-10 PROCEDURE — 3288F FALL RISK ASSESSMENT DOCD: CPT | Mod: CPTII,S$GLB,, | Performed by: STUDENT IN AN ORGANIZED HEALTH CARE EDUCATION/TRAINING PROGRAM

## 2022-10-10 PROCEDURE — 3288F PR FALLS RISK ASSESSMENT DOCUMENTED: ICD-10-PCS | Mod: CPTII,S$GLB,, | Performed by: STUDENT IN AN ORGANIZED HEALTH CARE EDUCATION/TRAINING PROGRAM

## 2022-10-10 PROCEDURE — 3008F BODY MASS INDEX DOCD: CPT | Mod: CPTII,S$GLB,, | Performed by: STUDENT IN AN ORGANIZED HEALTH CARE EDUCATION/TRAINING PROGRAM

## 2022-10-10 PROCEDURE — 3075F SYST BP GE 130 - 139MM HG: CPT | Mod: CPTII,S$GLB,, | Performed by: STUDENT IN AN ORGANIZED HEALTH CARE EDUCATION/TRAINING PROGRAM

## 2022-10-10 PROCEDURE — 1101F PR PT FALLS ASSESS DOC 0-1 FALLS W/OUT INJ PAST YR: ICD-10-PCS | Mod: CPTII,S$GLB,, | Performed by: STUDENT IN AN ORGANIZED HEALTH CARE EDUCATION/TRAINING PROGRAM

## 2022-10-10 PROCEDURE — 3079F PR MOST RECENT DIASTOLIC BLOOD PRESSURE 80-89 MM HG: ICD-10-PCS | Mod: CPTII,S$GLB,, | Performed by: STUDENT IN AN ORGANIZED HEALTH CARE EDUCATION/TRAINING PROGRAM

## 2022-10-10 NOTE — PROGRESS NOTES
S/p [place]  Feeling well  No pain  Ambulating well  Eating well  Incisions good  Path benign  Rtc prn

## 2022-11-28 ENCOUNTER — TELEPHONE (OUTPATIENT)
Dept: OTOLARYNGOLOGY | Facility: CLINIC | Age: 72
End: 2022-11-28
Payer: MEDICARE

## 2022-11-28 NOTE — TELEPHONE ENCOUNTER
Returned call to patient and offered several visits within ENT at other campuses due to Dr. Stringer being fully booked. Patient declined and stated she would try her PCP. Also advised pt she could try urgent care as well. Was thanked for calling.   ----- Message from Aimee Olson sent at 11/28/2022  8:08 AM CST -----  Regarding: same day  Contact: [phone]  Type:  Same Day Appointment Request    Caller is requesting a same day appointment.  Caller declined first available appointment listed below.    Name of Caller: self   When is the first available appointment?   Symptoms: Severe allergies causing her to wheeze, nasal drip and throat is irritated.   Best Call Back Number: Click [phone]  Additional Information:

## 2022-11-30 ENCOUNTER — TELEPHONE (OUTPATIENT)
Dept: OTOLARYNGOLOGY | Facility: CLINIC | Age: 72
End: 2022-11-30
Payer: MEDICARE

## 2022-11-30 NOTE — TELEPHONE ENCOUNTER
Returned call. Pt was seen on 11/28 by PCP. Was prescribed antibiotics and was given a steroid injection. Pt feels she is worse today and would like to schedule apt with Dr. Stringer for an evaluation. Apt scheduled for 12/1 at 10:20AM. Pt thanked me and verbalized understanding.     ----- Message from Lillian Vides sent at 11/30/2022 10:48 AM CST -----  Regarding: call back  Contact: [phone]  Type:  Needs Medical Advice    Who Called: PT   Symptoms (please be specific): wheezing really bad allergy sinus Asthma   How long has patient had these symptoms:  couple of days   Pharmacy name and phone #:  [facility] - [place], [place] - [address] Phone:  [phone] Fax:  [phone]  Would the patient rather a call back or a response via [facility]? Call back   Best Call Back Number:  [phone]  Additional Information:

## 2022-12-01 ENCOUNTER — OFFICE VISIT (OUTPATIENT)
Dept: OTOLARYNGOLOGY | Facility: CLINIC | Age: 72
End: 2022-12-01
Payer: MEDICARE

## 2022-12-01 ENCOUNTER — HOSPITAL ENCOUNTER (OUTPATIENT)
Dept: RADIOLOGY | Facility: HOSPITAL | Age: 72
Discharge: HOME | End: 2022-12-01
Attending: OTOLARYNGOLOGY
Payer: MEDICARE

## 2022-12-01 VITALS
BODY MASS INDEX: 30.64 KG/M2 | TEMPERATURE: 98 F | DIASTOLIC BLOOD PRESSURE: 98 MMHG | SYSTOLIC BLOOD PRESSURE: 151 MMHG | HEART RATE: 77 BPM | HEIGHT: 63 IN | WEIGHT: 172.94 LBS

## 2022-12-01 DIAGNOSIS — J40 BRONCHITIS: Chronic | ICD-10-CM

## 2022-12-01 DIAGNOSIS — J40 BRONCHITIS: ICD-10-CM

## 2022-12-01 DIAGNOSIS — J30.89 NON-SEASONAL ALLERGIC RHINITIS DUE TO OTHER ALLERGIC TRIGGER: Chronic | ICD-10-CM

## 2022-12-01 DIAGNOSIS — R05.9 COUGH, UNSPECIFIED TYPE: Primary | ICD-10-CM

## 2022-12-01 DIAGNOSIS — J30.1 SEASONAL ALLERGIC RHINITIS DUE TO POLLEN: Chronic | ICD-10-CM

## 2022-12-01 DIAGNOSIS — J45.21 MILD INTERMITTENT ASTHMATIC BRONCHITIS WITH ACUTE EXACERBATION: Chronic | ICD-10-CM

## 2022-12-01 PROCEDURE — 1125F PR PAIN SEVERITY QUANTIFIED, PAIN PRESENT: ICD-10-PCS | Mod: CPTII,S$GLB,, | Performed by: OTOLARYNGOLOGY

## 2022-12-01 PROCEDURE — 99999 PR PBB SHADOW E&M-EST. PATIENT-LVL V: ICD-10-PCS | Mod: PBBFAC,,, | Performed by: OTOLARYNGOLOGY

## 2022-12-01 PROCEDURE — 1159F PR MEDICATION LIST DOCUMENTED IN MEDICAL RECORD: ICD-10-PCS | Mod: CPTII,S$GLB,, | Performed by: OTOLARYNGOLOGY

## 2022-12-01 PROCEDURE — 99214 PR OFFICE/OUTPT VISIT, EST, LEVL IV, 30-39 MIN: ICD-10-PCS | Mod: S$GLB,,, | Performed by: OTOLARYNGOLOGY

## 2022-12-01 PROCEDURE — 1159F MED LIST DOCD IN RCRD: CPT | Mod: CPTII,S$GLB,, | Performed by: OTOLARYNGOLOGY

## 2022-12-01 PROCEDURE — 3288F FALL RISK ASSESSMENT DOCD: CPT | Mod: CPTII,S$GLB,, | Performed by: OTOLARYNGOLOGY

## 2022-12-01 PROCEDURE — 3288F PR FALLS RISK ASSESSMENT DOCUMENTED: ICD-10-PCS | Mod: CPTII,S$GLB,, | Performed by: OTOLARYNGOLOGY

## 2022-12-01 PROCEDURE — 1125F AMNT PAIN NOTED PAIN PRSNT: CPT | Mod: CPTII,S$GLB,, | Performed by: OTOLARYNGOLOGY

## 2022-12-01 PROCEDURE — 71046 XR CHEST PA AND LATERAL: ICD-10-PCS | Mod: 26,,, | Performed by: RADIOLOGY

## 2022-12-01 PROCEDURE — 3008F PR BODY MASS INDEX (BMI) DOCUMENTED: ICD-10-PCS | Mod: CPTII,S$GLB,, | Performed by: OTOLARYNGOLOGY

## 2022-12-01 PROCEDURE — 71046 X-RAY EXAM CHEST 2 VIEWS: CPT | Mod: 26,,, | Performed by: RADIOLOGY

## 2022-12-01 PROCEDURE — 99214 OFFICE O/P EST MOD 30 MIN: CPT | Mod: S$GLB,,, | Performed by: OTOLARYNGOLOGY

## 2022-12-01 PROCEDURE — 3008F BODY MASS INDEX DOCD: CPT | Mod: CPTII,S$GLB,, | Performed by: OTOLARYNGOLOGY

## 2022-12-01 PROCEDURE — 3077F PR MOST RECENT SYSTOLIC BLOOD PRESSURE >= 140 MM HG: ICD-10-PCS | Mod: CPTII,S$GLB,, | Performed by: OTOLARYNGOLOGY

## 2022-12-01 PROCEDURE — 1101F PR PT FALLS ASSESS DOC 0-1 FALLS W/OUT INJ PAST YR: ICD-10-PCS | Mod: CPTII,S$GLB,, | Performed by: OTOLARYNGOLOGY

## 2022-12-01 PROCEDURE — 3080F DIAST BP >= 90 MM HG: CPT | Mod: CPTII,S$GLB,, | Performed by: OTOLARYNGOLOGY

## 2022-12-01 PROCEDURE — 1101F PT FALLS ASSESS-DOCD LE1/YR: CPT | Mod: CPTII,S$GLB,, | Performed by: OTOLARYNGOLOGY

## 2022-12-01 PROCEDURE — 3080F PR MOST RECENT DIASTOLIC BLOOD PRESSURE >= 90 MM HG: ICD-10-PCS | Mod: CPTII,S$GLB,, | Performed by: OTOLARYNGOLOGY

## 2022-12-01 PROCEDURE — 99999 PR PBB SHADOW E&M-EST. PATIENT-LVL V: CPT | Mod: PBBFAC,,, | Performed by: OTOLARYNGOLOGY

## 2022-12-01 PROCEDURE — 3077F SYST BP >= 140 MM HG: CPT | Mod: CPTII,S$GLB,, | Performed by: OTOLARYNGOLOGY

## 2022-12-01 PROCEDURE — 71046 X-RAY EXAM CHEST 2 VIEWS: CPT | Mod: TC,FY

## 2022-12-01 RX ORDER — MONTELUKAST SODIUM 10 MG/1
10 TABLET ORAL NIGHTLY
Qty: 30 TABLET | Refills: 10 | Status: SHIPPED | OUTPATIENT
Start: 2022-12-01 | End: 2023-12-11

## 2022-12-01 RX ORDER — FLUTICASONE PROPIONATE AND SALMETEROL 250; 50 UG/1; UG/1
1 POWDER RESPIRATORY (INHALATION) 2 TIMES DAILY
Qty: 1 EACH | Refills: 1 | Status: SHIPPED | OUTPATIENT
Start: 2022-12-01 | End: 2023-06-13

## 2022-12-01 RX ORDER — METHYLPREDNISOLONE 4 MG/1
TABLET ORAL
Qty: 1 EACH | Refills: 0 | Status: SHIPPED | OUTPATIENT
Start: 2022-12-01 | End: 2023-01-31 | Stop reason: SDUPTHER

## 2022-12-01 RX ORDER — ALBUTEROL SULFATE 90 UG/1
2 AEROSOL, METERED RESPIRATORY (INHALATION) EVERY 6 HOURS PRN
Qty: 18 G | Refills: 3 | Status: SHIPPED | OUTPATIENT
Start: 2022-12-01

## 2022-12-01 NOTE — PROGRESS NOTES
Chief Complaint   Patient presents with    Cough     Wheezing, with chest congestion- started Friday    Nasal Congestion       HPI:  Patient is a 71 y.o. female who has previously seen me for allergic rhinitis and intermittent sinusitis.      Since the last visit, the patient reports allergic flare up and worsening of her asthmatic symptoms.  She is coughing and having issues with mild shortness of breath and wheezing.  She reports minimal nasal congestion and postnasal drip, and has had some mild sore throat but nothing significant.  No fever noted.  She often gets these symptoms when she has allergic flare ups, and she was seen by Dr. Bustos last week who gave her a Z-Twin and a steroid shot at the beginning of the symptoms.  He tested for COVID and flu which were both negative.  She uses an albuterol inhaler and has use Symbicort in the past.  She has never had full evaluation by an allergist, but has seen a pulmonologist who did not find any underlying lung issues ongoing.  She takes nasal sprays and Zyrtec daily for her allergy symptoms.    Active Ambulatory Problems     Diagnosis Date Noted    Bilateral sciatica 12/08/2017    Essential (primary) hypertension 10/27/2015    Gastro-esophageal reflux disease without esophagitis 10/27/2015    Heart murmur 06/07/2016    Insomnia 12/13/2018    Mixed hyperlipidemia 10/27/2015    Adjustment disorder 03/22/2021     Resolved Ambulatory Problems     Diagnosis Date Noted    Tachyarrhythmia 09/17/2019    Cholelithiasis with acute cholecystitis 09/20/2022    Cholecystitis 09/20/2022     No Additional Past Medical History       Review of Systems  General: negative for chills, fever or weight loss  Psychological: negative for mood changes or depression  Ophthalmic: negative for blurry vision, photophobia or eye pain  ENT: see HPI  Respiratory: no cough, shortness of breath, or wheezing  Cardiovascular: no chest pain or dyspnea on exertion  Gastrointestinal: no abdominal pain,  change in bowel habits, or black/ bloody stools  Musculoskeletal: negative for gait disturbance or muscular weakness  Neurological: no syncope or seizures; no ataxia  Dermatological: negative for pruritis, rash and jaundice  Hematologic/lymphatic: no easy bruising, no new adenopathy    Physical Exam     Vitals:    12/01/22 1014   BP: (!) 151/98   Pulse: 77   Temp: 98.1 °F (36.7 °C)         Constitutional:   She is oriented to person, place, and time. Vital signs are normal. She appears well-developed and well-nourished. She appears alert. She is cooperative. Normal speech.      Head:  Normocephalic and atraumatic. Salivary glands normal.  Facial strength is normal.      Ears:  Hearing normal to normal and whispered voice; external ear normal without scars, lesions, or masses; ear canal, tympanic membrane, and middle ear normal., right ear hearing normal to normal and whispered voice; external ear normal without scars, lesions, or masses; ear canal, tympanic membrane, and middle ear normal. and left ear hearing normal to normal and whispered voice; external ear normal without scars, lesions, or masses; ear canal, tympanic membrane, and middle ear normal..   Right Ear: Tympanic membrane is not perforated and not erythematous. No middle ear effusion.   Left Ear: Tympanic membrane is not perforated and not erythematous.  No middle ear effusion.     Nose:  Mucosal edema and rhinorrhea present. No septal deviation or polyps. Turbinates abnormal and turbinate hypertrophy (3+, boggy, congested mucosa).  Right sinus exhibits no maxillary sinus tenderness and no frontal sinus tenderness. Left sinus exhibits no maxillary sinus tenderness and no frontal sinus tenderness.     Mouth/Throat  Oropharynx clear and moist without lesions or asymmetry, lips, teeth, and gums normal and oropharynx normal. No mucous membrane lesions. No oropharyngeal exudate, posterior oropharyngeal edema or posterior oropharyngeal erythema. +1.   Tonsillar erythema, tonsillar exudate.  Mirror exam not performed due to patient tolerance.  Mirror exam not performed due to patient tolerance.      Neck:  Neck normal without thyromegaly masses, asymmetry, normal tracheal structure, crepitus, and tenderness, thyroid normal, trachea normal, phonation normal, full range of motion with neck supple and no adenopathy. No JVD present. Carotid bruit is not present. No thyroid mass and no thyromegaly present.     She has no cervical adenopathy.     Cardiovascular:    Normal rate, regular rhythm and rate and rhythm, heart sounds, and pulses normal.              Pulmonary/Chest:   Effort and breath sounds normal. She has decreased breath sounds in the right upper field and the left upper field. She has wheezes in the right upper field, the right middle field, the right lower field, the left upper field, the left middle field and the left lower field. She has rhonchi.     Psychiatric:   She has a normal mood and affect. Her speech is normal and behavior is normal.     Neurological:   She is alert and oriented to person, place, and time. She has neurological normal, alert and oriented. No cranial nerve deficit.     Skin:   No abrasions, lacerations, lesions, or rashes.         Diagnostics:  Today  XR CHEST PA AND LATERAL     CLINICAL HISTORY:  Bronchitis, not specified as acute or chronic     TECHNIQUE:  PA and lateral views of the chest were performed.     COMPARISON:  09/19/2022     FINDINGS:  The lungs are clear, with normal appearance of pulmonary vasculature.No pleural effusion.No pneumothorax.     The cardiac silhouette is normal in size. The hilar and mediastinal contours are unremarkable.     Bones are intact. Scoliosis     Impression:     No acute abnormality.    Assessment/Plan:      ICD-10-CM ICD-9-CM    1. Cough, unspecified type  R05.9 786.2       2. Bronchitis  J40 490 X-Ray Chest PA And Lateral      3. Mild intermittent asthmatic bronchitis with acute  exacerbation  J45.21 493.92 Ambulatory referral/consult to Allergy      4. Non-seasonal allergic rhinitis due to other allergic trigger  J30.89 477.8       5. Seasonal allergic rhinitis due to pollen  J30.1 477.0           Finished Z-Twin.  Medrol Dosepak to begin today.  Continue azelastine and Flonase, continue Zyrtec and add Singulair daily.  Continue albuterol p.r.n., Advair inhaler b.i.d..  Chest x-ray ordered and reported above.    Referral to Allergy immunology for workup for recurrent asthmatic bronchitis and better control of ongoing recurrent asthma symptoms.    Terrie Stringer MD  [facility]

## 2022-12-01 NOTE — PATIENT INSTRUCTIONS
Referral to allergy for frequent asthmatic bronchitis exacerbations.      Continue allergy meds and add singulair.  Add advair inhaler and continue albuterol.   Finish zpack and add medrol dosepack.      Chest xray to assess acute exacerbation.

## 2022-12-08 ENCOUNTER — TELEPHONE (OUTPATIENT)
Dept: OTOLARYNGOLOGY | Facility: CLINIC | Age: 72
End: 2022-12-08
Payer: MEDICARE

## 2022-12-08 RX ORDER — LEVOFLOXACIN 500 MG/1
500 TABLET, FILM COATED ORAL DAILY
Qty: 7 TABLET | Refills: 0 | Status: SHIPPED | OUTPATIENT
Start: 2022-12-08 | End: 2022-12-15

## 2022-12-08 NOTE — TELEPHONE ENCOUNTER
Called to relay Dr. Stringer's message to patient. Patient verbalized understanding. Was thanked for call.     Response to phone call below.  12/8/22    Xray results were noted in the patient's visit note from 12/1/22- xray did not show any consolidation, pneumonia, or other acute findings.     I will send out a stronger antibiotic to see if the helps with the upper respiratory symptoms.  If wheezing persists even with new antibiotics and with continuing steroids and inhalers, then she may need a repeat xray.      Terrie Stringer MD  [facility]          Returned call to patient. Patient stated she is still wheezing and experiencing yellow/green mucous. Patient would like doctor to be contacted regarding providing another medication. Patient stated she would also like results from from xray. Sent message to Dr. Stringer.   ----- Message from Valentino Hansen sent at 12/8/2022  9:35 AM CST -----  Type:  Needs Medical Advice    Who Called: Pt  Would the patient rather a call back or a response via [facility]? call  Best Call Back Number: [phone]  Additional Information: Pt calling states that she received a text confirming an appointment for 12/09/2022 please call pt        Type:  RX New Request    Who Called: Pt  RX Name and Strength:azithromycin (Z-GINETTE) 250 MG tablet  [facility] with [facility] number[facility] - [place], [place] - [address]   Phone: [phone]  Fax: [phone]  Local or Mail Order:local  Ordering Provider:Shade Bustos    Pt is requesting a different medication other than the strip above

## 2023-01-30 ENCOUNTER — TELEPHONE (OUTPATIENT)
Dept: OTOLARYNGOLOGY | Facility: CLINIC | Age: 73
End: 2023-01-30
Payer: MEDICARE

## 2023-01-30 NOTE — TELEPHONE ENCOUNTER
Returned call. Apt scheduled with Dr. Stringer for 1/31 at 10:20AM. Pt thanked me and verbalized understanding.     ----- Message from Kaity Diaz sent at 1/30/2023 10:43 AM CST -----  Type:  Needs Medical Advice    Who Called: Pt  Symptoms (please be specific):  Asthma like symptoms and burning throat and nose  How long has patient had these symptoms:     Pharmacy name and phone #:  [facility] - [facility] [address]   Phone:  [phone]  Would the patient rather a call back or a response via [facility]?  call  Best Call Back Number: [phone]  Additional Information:  Pt would like a call back regarding her asthma and sore throat.  Pt has an appt with an allergist next week and is not sure shell make it unless DR prescribes some type of medication.

## 2023-01-31 ENCOUNTER — OFFICE VISIT (OUTPATIENT)
Dept: OTOLARYNGOLOGY | Facility: CLINIC | Age: 73
End: 2023-01-31
Payer: MEDICARE

## 2023-01-31 VITALS
SYSTOLIC BLOOD PRESSURE: 151 MMHG | HEART RATE: 67 BPM | HEIGHT: 63 IN | DIASTOLIC BLOOD PRESSURE: 89 MMHG | WEIGHT: 171.5 LBS | BODY MASS INDEX: 30.39 KG/M2

## 2023-01-31 DIAGNOSIS — J01.00 ACUTE NON-RECURRENT MAXILLARY SINUSITIS: ICD-10-CM

## 2023-01-31 DIAGNOSIS — R49.0 HOARSENESS: ICD-10-CM

## 2023-01-31 DIAGNOSIS — J40 BRONCHITIS: ICD-10-CM

## 2023-01-31 DIAGNOSIS — J45.20 MILD INTERMITTENT ASTHMATIC BRONCHITIS WITHOUT COMPLICATION: ICD-10-CM

## 2023-01-31 DIAGNOSIS — J02.9 PHARYNGITIS, UNSPECIFIED ETIOLOGY: Primary | ICD-10-CM

## 2023-01-31 DIAGNOSIS — J30.1 SEASONAL ALLERGIC RHINITIS DUE TO POLLEN: ICD-10-CM

## 2023-01-31 DIAGNOSIS — J06.9 UPPER RESPIRATORY TRACT INFECTION, UNSPECIFIED TYPE: ICD-10-CM

## 2023-01-31 PROCEDURE — 3079F DIAST BP 80-89 MM HG: CPT | Mod: CPTII,S$GLB,, | Performed by: OTOLARYNGOLOGY

## 2023-01-31 PROCEDURE — 3077F SYST BP >= 140 MM HG: CPT | Mod: CPTII,S$GLB,, | Performed by: OTOLARYNGOLOGY

## 2023-01-31 PROCEDURE — 1101F PT FALLS ASSESS-DOCD LE1/YR: CPT | Mod: CPTII,S$GLB,, | Performed by: OTOLARYNGOLOGY

## 2023-01-31 PROCEDURE — 99999 PR PBB SHADOW E&M-EST. PATIENT-LVL IV: CPT | Mod: PBBFAC,,, | Performed by: OTOLARYNGOLOGY

## 2023-01-31 PROCEDURE — 3288F FALL RISK ASSESSMENT DOCD: CPT | Mod: CPTII,S$GLB,, | Performed by: OTOLARYNGOLOGY

## 2023-01-31 PROCEDURE — 1160F PR REVIEW ALL MEDS BY PRESCRIBER/CLIN PHARMACIST DOCUMENTED: ICD-10-PCS | Mod: CPTII,S$GLB,, | Performed by: OTOLARYNGOLOGY

## 2023-01-31 PROCEDURE — 3079F PR MOST RECENT DIASTOLIC BLOOD PRESSURE 80-89 MM HG: ICD-10-PCS | Mod: CPTII,S$GLB,, | Performed by: OTOLARYNGOLOGY

## 2023-01-31 PROCEDURE — 1159F MED LIST DOCD IN RCRD: CPT | Mod: CPTII,S$GLB,, | Performed by: OTOLARYNGOLOGY

## 2023-01-31 PROCEDURE — 99214 PR OFFICE/OUTPT VISIT, EST, LEVL IV, 30-39 MIN: ICD-10-PCS | Mod: 25,S$GLB,, | Performed by: OTOLARYNGOLOGY

## 2023-01-31 PROCEDURE — 99214 OFFICE O/P EST MOD 30 MIN: CPT | Mod: 25,S$GLB,, | Performed by: OTOLARYNGOLOGY

## 2023-01-31 PROCEDURE — 1126F PR PAIN SEVERITY QUANTIFIED, NO PAIN PRESENT: ICD-10-PCS | Mod: CPTII,S$GLB,, | Performed by: OTOLARYNGOLOGY

## 2023-01-31 PROCEDURE — 31575 DIAGNOSTIC LARYNGOSCOPY: CPT | Mod: S$GLB,,, | Performed by: OTOLARYNGOLOGY

## 2023-01-31 PROCEDURE — 1126F AMNT PAIN NOTED NONE PRSNT: CPT | Mod: CPTII,S$GLB,, | Performed by: OTOLARYNGOLOGY

## 2023-01-31 PROCEDURE — 99999 PR PBB SHADOW E&M-EST. PATIENT-LVL IV: ICD-10-PCS | Mod: PBBFAC,,, | Performed by: OTOLARYNGOLOGY

## 2023-01-31 PROCEDURE — 3077F PR MOST RECENT SYSTOLIC BLOOD PRESSURE >= 140 MM HG: ICD-10-PCS | Mod: CPTII,S$GLB,, | Performed by: OTOLARYNGOLOGY

## 2023-01-31 PROCEDURE — 3008F BODY MASS INDEX DOCD: CPT | Mod: CPTII,S$GLB,, | Performed by: OTOLARYNGOLOGY

## 2023-01-31 PROCEDURE — 1160F RVW MEDS BY RX/DR IN RCRD: CPT | Mod: CPTII,S$GLB,, | Performed by: OTOLARYNGOLOGY

## 2023-01-31 PROCEDURE — 1159F PR MEDICATION LIST DOCUMENTED IN MEDICAL RECORD: ICD-10-PCS | Mod: CPTII,S$GLB,, | Performed by: OTOLARYNGOLOGY

## 2023-01-31 PROCEDURE — 3288F PR FALLS RISK ASSESSMENT DOCUMENTED: ICD-10-PCS | Mod: CPTII,S$GLB,, | Performed by: OTOLARYNGOLOGY

## 2023-01-31 PROCEDURE — 1101F PR PT FALLS ASSESS DOC 0-1 FALLS W/OUT INJ PAST YR: ICD-10-PCS | Mod: CPTII,S$GLB,, | Performed by: OTOLARYNGOLOGY

## 2023-01-31 PROCEDURE — 3008F PR BODY MASS INDEX (BMI) DOCUMENTED: ICD-10-PCS | Mod: CPTII,S$GLB,, | Performed by: OTOLARYNGOLOGY

## 2023-01-31 PROCEDURE — 31575 LARYNGOSCOPY: ICD-10-PCS | Mod: S$GLB,,, | Performed by: OTOLARYNGOLOGY

## 2023-01-31 RX ORDER — CLARITHROMYCIN 500 MG/1
500 TABLET, FILM COATED ORAL 2 TIMES DAILY
Qty: 20 TABLET | Refills: 0 | Status: SHIPPED | OUTPATIENT
Start: 2023-01-31 | End: 2023-02-10

## 2023-01-31 RX ORDER — METHYLPREDNISOLONE 4 MG/1
TABLET ORAL
Qty: 1 EACH | Refills: 0 | Status: SHIPPED | OUTPATIENT
Start: 2023-01-31 | End: 2023-06-13

## 2023-01-31 NOTE — PATIENT INSTRUCTIONS
Continue all allergy medications.    Add mucinex D or mucinex DM daily.   Get Gideon Med Sinus Rinse from the pharmacy.  Use it daily according to the instructions.  It will help to cut down the inflammation and mucus/crusting in your nose and sinuses.   Start antibiotics and steroid taper today.      Restart daily use of advair.      See allergist in next 2 weeks for their workup.

## 2023-01-31 NOTE — PROGRESS NOTES
Chief Complaint   Patient presents with    Sinus Problem       HPI:  Patient is a 72 y.o. female who has previously seen me for rhinitis, allergic bronchitis, acute sinusitis.  At last visit patient had allergy and bronchitis flare up and was treated.  She was also referred to Allergy immunology for workup, and that appointment is upcoming.    Since the last visit, the patient reports she did well for a while and had not had any flare up until the last week or 2.  With the weather changes she noticed some increase in her allergy and asthma symptoms, and has had throat pain, nasal burning, postnasal drip, and some mild hoarseness over the last week or 2.  She is continued to take her daily nasal sprays, Zyrtec, Singulair, and has not done any additional OTC medications.  She has not restarted her Advair inhaler at this time.  She reports some mild feeling of tightness in the chest, but no severe wheezing or significant shortness of breath.    Active Ambulatory Problems     Diagnosis Date Noted    Bilateral sciatica 12/08/2017    Essential (primary) hypertension 10/27/2015    Gastro-esophageal reflux disease without esophagitis 10/27/2015    Heart murmur 06/07/2016    Insomnia 12/13/2018    Mixed hyperlipidemia 10/27/2015    Adjustment disorder 03/22/2021     Resolved Ambulatory Problems     Diagnosis Date Noted    Tachyarrhythmia 09/17/2019    Cholelithiasis with acute cholecystitis 09/20/2022    Cholecystitis 09/20/2022     No Additional Past Medical History       Review of Systems  General: negative for chills, fever or weight loss  Psychological: negative for mood changes or depression  Ophthalmic: negative for blurry vision, photophobia or eye pain  ENT: see HPI  Respiratory: no cough, shortness of breath, or wheezing  Cardiovascular: no chest pain or dyspnea on exertion  Gastrointestinal: no abdominal pain, change in bowel habits, or black/ bloody stools  Musculoskeletal: negative for gait disturbance or  muscular weakness  Neurological: no syncope or seizures; no ataxia  Dermatological: negative for pruritis, rash and jaundice  Hematologic/lymphatic: no easy bruising, no new adenopathy    Physical Exam     Vitals:    01/31/23 1035   BP: (!) 151/89   Pulse: 67         Constitutional:   She is oriented to person, place, and time. Vital signs are normal. She appears well-developed and well-nourished. She appears alert. She is cooperative. Normal speech.      Head:  Normocephalic and atraumatic. Salivary glands normal.  Facial strength is normal.      Ears:  Hearing normal to normal and whispered voice; external ear normal without scars, lesions, or masses; ear canal, tympanic membrane, and middle ear normal., right ear hearing normal to normal and whispered voice; external ear normal without scars, lesions, or masses; ear canal, tympanic membrane, and middle ear normal. and left ear hearing normal to normal and whispered voice; external ear normal without scars, lesions, or masses; ear canal, tympanic membrane, and middle ear normal..   Right Ear: Tympanic membrane is not perforated, not erythematous and not retracted. No middle ear effusion.   Left Ear: Tympanic membrane is not perforated, not erythematous and not retracted.  No middle ear effusion.     Nose:  Mucosal edema and rhinorrhea present. No septal deviation or polyps. Turbinates abnormal and turbinate hypertrophy (3+, boggy, congested mucosa).  Right sinus exhibits no maxillary sinus tenderness and no frontal sinus tenderness. Left sinus exhibits no maxillary sinus tenderness and no frontal sinus tenderness.     Mouth/Throat  Oropharynx clear and moist without lesions or asymmetry, lips, teeth, and gums normal and oropharynx normal. No mucous membrane lesions. Posterior oropharyngeal edema (Along posterior tonsillar pillar/posterior oropharynx) and posterior oropharyngeal erythema present. No oropharyngeal exudate. Tonsillar erythema, tonsillar exudate.   Mirror exam not performed due to patient tolerance.  Mirror exam not performed due to patient tolerance.      Neck:  Neck normal without thyromegaly masses, asymmetry, normal tracheal structure, crepitus, and tenderness, thyroid normal, trachea normal, phonation normal, full range of motion with neck supple and no adenopathy. No JVD present. Carotid bruit is not present. No thyroid mass and no thyromegaly present.     She has no cervical adenopathy.     Cardiovascular:    Normal rate, regular rhythm and rate and rhythm, heart sounds, and pulses normal.              Pulmonary/Chest:   She has wheezes (faint, on expiration) in the right upper field and the right middle field. She has no rhonchi. She has no rales.     Psychiatric:   She has a normal mood and affect. Her speech is normal and behavior is normal.     Neurological:   She is alert and oriented to person, place, and time. She has neurological normal, alert and oriented. No cranial nerve deficit.     Skin:   No abrasions, lacerations, lesions, or rashes.         Laryngoscopy    Date/Time: 1/31/2023 10:20 AM  Performed by: Terrie Stringer MD  Authorized by: Terrie Stringer MD     Consent Done?:  Yes (Verbal)  Anesthesia:     Local anesthetic:  Topical anesthetic    Patient tolerance:  Patient tolerated the procedure well with no immediate complications    Decongestion performed?: Yes    Laryngoscopy:     Areas examined:  Nasal cavities, vocal cords, nasopharynx, oropharynx, hypopharynx and larynx  Nose External:      No external nasal deformity  Nose Intranasal:      Mucosa no polyps     Mucosa ulcers not present     No mucosa lesions present     No septum gross deformity     Enlarged turbinates  Nasopharynx:      No mucosa lesions     Adenoids present (erythema, generalized irritation of the adenoid bed and nasopharynx, extending into right oropharynx)     Posterior choanae patent     Eustachian tube patent  Larynx/hypopharynx:      No epiglottis lesions      No epiglottis edema     No AE folds lesions     No vocal cord polyps     Equal and normal bilateral     No hypopharynx lesions     No piriform sinus pooling     No piriform sinus lesions     No post cricoid edema     No post cricoid erythema      Assessment/Plan:      ICD-10-CM ICD-9-CM    1. Pharyngitis, unspecified etiology  J02.9 462       2. Bronchitis  J40 490       3. Mild intermittent asthmatic bronchitis without complication  J45.20 493.90       4. Seasonal allergic rhinitis due to pollen  J30.1 477.0       5. Acute non-recurrent maxillary sinusitis  J01.00 461.0       6. Upper respiratory tract infection, unspecified type  J06.9 465.9       7. Hoarseness  R49.0 784.42           Will treat patient with Biaxin and Medrol Dosepak for exacerbation of asthma/bronchitis symptoms as well as acute pharyngitis and recurrent sinusitis.  Continue all allergy medications.  Add Mucinex DM daily.    Use Advair inhaler daily or b.i.d..    Patient will see Allergy immunology next week for their workup and recommendations.    Terrie Stringer MD  [facility]               Cimetidine Counseling:  I discussed with the patient the risks of Cimetidine including but not limited to gynecomastia, headache, diarrhea, nausea, drowsiness, arrhythmias, pancreatitis, skin rashes, psychosis, bone marrow suppression and kidney toxicity.

## 2023-02-07 ENCOUNTER — LAB VISIT (OUTPATIENT)
Dept: LAB | Facility: HOSPITAL | Age: 73
End: 2023-02-07
Payer: MEDICARE

## 2023-02-07 ENCOUNTER — OFFICE VISIT (OUTPATIENT)
Dept: ALLERGY | Facility: CLINIC | Age: 73
End: 2023-02-07
Payer: MEDICARE

## 2023-02-07 VITALS — BODY MASS INDEX: 30.31 KG/M2 | WEIGHT: 171.06 LBS | OXYGEN SATURATION: 97 % | HEART RATE: 76 BPM | HEIGHT: 63 IN

## 2023-02-07 DIAGNOSIS — J32.9 RECURRENT SINUS INFECTIONS: ICD-10-CM

## 2023-02-07 DIAGNOSIS — J31.0 CHRONIC RHINITIS: ICD-10-CM

## 2023-02-07 DIAGNOSIS — J45.20 MILD INTERMITTENT ASTHMA WITHOUT COMPLICATION: ICD-10-CM

## 2023-02-07 DIAGNOSIS — H10.423 SIMPLE CHRONIC CONJUNCTIVITIS OF BOTH EYES: ICD-10-CM

## 2023-02-07 DIAGNOSIS — H10.423 SIMPLE CHRONIC CONJUNCTIVITIS OF BOTH EYES: Primary | ICD-10-CM

## 2023-02-07 LAB
BASOPHILS # BLD AUTO: 0.03 K/UL (ref 0–0.2)
BASOPHILS NFR BLD: 0.8 % (ref 0–1.9)
DIFFERENTIAL METHOD: ABNORMAL
EOSINOPHIL # BLD AUTO: 0.1 K/UL (ref 0–0.5)
EOSINOPHIL NFR BLD: 2.2 % (ref 0–8)
ERYTHROCYTE [DISTWIDTH] IN BLOOD BY AUTOMATED COUNT: 13 % (ref 11.5–14.5)
HCT VFR BLD AUTO: 41 % (ref 37–48.5)
HGB BLD-MCNC: 13.1 G/DL (ref 12–16)
IGA SERPL-MCNC: 148 MG/DL (ref 40–350)
IGE SERPL-ACNC: <35 IU/ML (ref 0–100)
IGG SERPL-MCNC: 869 MG/DL (ref 650–1600)
IGM SERPL-MCNC: 60 MG/DL (ref 50–300)
IMM GRANULOCYTES # BLD AUTO: 0.01 K/UL (ref 0–0.04)
IMM GRANULOCYTES NFR BLD AUTO: 0.3 % (ref 0–0.5)
LYMPHOCYTES # BLD AUTO: 1.3 K/UL (ref 1–4.8)
LYMPHOCYTES NFR BLD: 35.4 % (ref 18–48)
MCH RBC QN AUTO: 29.9 PG (ref 27–31)
MCHC RBC AUTO-ENTMCNC: 32 G/DL (ref 32–36)
MCV RBC AUTO: 94 FL (ref 82–98)
MONOCYTES # BLD AUTO: 0.5 K/UL (ref 0.3–1)
MONOCYTES NFR BLD: 12.4 % (ref 4–15)
NEUTROPHILS # BLD AUTO: 1.8 K/UL (ref 1.8–7.7)
NEUTROPHILS NFR BLD: 48.9 % (ref 38–73)
NRBC BLD-RTO: 0 /100 WBC
PLATELET # BLD AUTO: 266 K/UL (ref 150–450)
PMV BLD AUTO: 9.7 FL (ref 9.2–12.9)
RBC # BLD AUTO: 4.38 M/UL (ref 4–5.4)
WBC # BLD AUTO: 3.62 K/UL (ref 3.9–12.7)

## 2023-02-07 PROCEDURE — 86360 T CELL ABSOLUTE COUNT/RATIO: CPT | Performed by: ALLERGY & IMMUNOLOGY

## 2023-02-07 PROCEDURE — 1160F PR REVIEW ALL MEDS BY PRESCRIBER/CLIN PHARMACIST DOCUMENTED: ICD-10-PCS | Mod: CPTII,S$GLB,, | Performed by: ALLERGY & IMMUNOLOGY

## 2023-02-07 PROCEDURE — 86003 ALLG SPEC IGE CRUDE XTRC EA: CPT | Performed by: ALLERGY & IMMUNOLOGY

## 2023-02-07 PROCEDURE — 86359 T CELLS TOTAL COUNT: CPT | Performed by: ALLERGY & IMMUNOLOGY

## 2023-02-07 PROCEDURE — 99999 PR PBB SHADOW E&M-EST. PATIENT-LVL IV: CPT | Mod: PBBFAC,,, | Performed by: ALLERGY & IMMUNOLOGY

## 2023-02-07 PROCEDURE — 82784 ASSAY IGA/IGD/IGG/IGM EACH: CPT | Mod: 59 | Performed by: ALLERGY & IMMUNOLOGY

## 2023-02-07 PROCEDURE — 86357 NK CELLS TOTAL COUNT: CPT | Performed by: ALLERGY & IMMUNOLOGY

## 2023-02-07 PROCEDURE — 82785 ASSAY OF IGE: CPT | Performed by: ALLERGY & IMMUNOLOGY

## 2023-02-07 PROCEDURE — 1126F PR PAIN SEVERITY QUANTIFIED, NO PAIN PRESENT: ICD-10-PCS | Mod: CPTII,S$GLB,, | Performed by: ALLERGY & IMMUNOLOGY

## 2023-02-07 PROCEDURE — 85025 COMPLETE CBC W/AUTO DIFF WBC: CPT | Performed by: ALLERGY & IMMUNOLOGY

## 2023-02-07 PROCEDURE — 3008F BODY MASS INDEX DOCD: CPT | Mod: CPTII,S$GLB,, | Performed by: ALLERGY & IMMUNOLOGY

## 2023-02-07 PROCEDURE — 82787 IGG 1 2 3 OR 4 EACH: CPT | Mod: 59 | Performed by: ALLERGY & IMMUNOLOGY

## 2023-02-07 PROCEDURE — 1159F MED LIST DOCD IN RCRD: CPT | Mod: CPTII,S$GLB,, | Performed by: ALLERGY & IMMUNOLOGY

## 2023-02-07 PROCEDURE — 36415 COLL VENOUS BLD VENIPUNCTURE: CPT | Mod: PO | Performed by: ALLERGY & IMMUNOLOGY

## 2023-02-07 PROCEDURE — 3008F PR BODY MASS INDEX (BMI) DOCUMENTED: ICD-10-PCS | Mod: CPTII,S$GLB,, | Performed by: ALLERGY & IMMUNOLOGY

## 2023-02-07 PROCEDURE — 99204 PR OFFICE/OUTPT VISIT, NEW, LEVL IV, 45-59 MIN: ICD-10-PCS | Mod: S$GLB,,, | Performed by: ALLERGY & IMMUNOLOGY

## 2023-02-07 PROCEDURE — 1160F RVW MEDS BY RX/DR IN RCRD: CPT | Mod: CPTII,S$GLB,, | Performed by: ALLERGY & IMMUNOLOGY

## 2023-02-07 PROCEDURE — 1126F AMNT PAIN NOTED NONE PRSNT: CPT | Mod: CPTII,S$GLB,, | Performed by: ALLERGY & IMMUNOLOGY

## 2023-02-07 PROCEDURE — 99999 PR PBB SHADOW E&M-EST. PATIENT-LVL IV: ICD-10-PCS | Mod: PBBFAC,,, | Performed by: ALLERGY & IMMUNOLOGY

## 2023-02-07 PROCEDURE — 86003 ALLG SPEC IGE CRUDE XTRC EA: CPT | Mod: 59 | Performed by: ALLERGY & IMMUNOLOGY

## 2023-02-07 PROCEDURE — 99204 OFFICE O/P NEW MOD 45 MIN: CPT | Mod: S$GLB,,, | Performed by: ALLERGY & IMMUNOLOGY

## 2023-02-07 PROCEDURE — 86355 B CELLS TOTAL COUNT: CPT | Performed by: ALLERGY & IMMUNOLOGY

## 2023-02-07 PROCEDURE — 1159F PR MEDICATION LIST DOCUMENTED IN MEDICAL RECORD: ICD-10-PCS | Mod: CPTII,S$GLB,, | Performed by: ALLERGY & IMMUNOLOGY

## 2023-02-07 PROCEDURE — 82784 ASSAY IGA/IGD/IGG/IGM EACH: CPT | Performed by: ALLERGY & IMMUNOLOGY

## 2023-02-07 PROCEDURE — 86317 IMMUNOASSAY INFECTIOUS AGENT: CPT | Performed by: ALLERGY & IMMUNOLOGY

## 2023-02-07 RX ORDER — NEOMYCIN SULFATE, POLYMYXIN B SULFATE AND DEXAMETHASONE 3.5; 10000; 1 MG/G; [USP'U]/G; MG/G
OINTMENT OPHTHALMIC
COMMUNITY
Start: 2023-01-11

## 2023-02-07 RX ORDER — FLUTICASONE PROPIONATE 50 MCG
SPRAY, SUSPENSION (ML) NASAL
COMMUNITY
Start: 2022-11-30 | End: 2023-03-03 | Stop reason: SDUPTHER

## 2023-02-07 RX ORDER — PROPYLENE GLYCOL 0.06 MG/ML
EMULSION OPHTHALMIC
COMMUNITY
Start: 2023-01-11

## 2023-02-07 RX ORDER — MONTELUKAST SODIUM 10 MG/1
TABLET ORAL
COMMUNITY
Start: 2022-12-01 | End: 2023-06-13

## 2023-02-07 NOTE — PROGRESS NOTES
Subjective:       Patient ID: Karen Arango is a 72 y.o. female.    Chief Complaint:  Allergies (Sneezing, PND) and Sinusitis (Sinus congestion)      73 yo woman presents for consult from Dr Terrie Stringer for recurrent sinus infections. She states she has had sinus issues for many years. Has seen ENT.long ago, had allergy test and did shots for about 9 years. Remembers being positive to insecticides, smoke, mold, naga root. Shouts did help. Now for years suffers with nasal congestion, PND< sore throat, thick phlegm which gags her, throat clearing, occ sneeze. Then goes to chest with chest congestion, tight and even SOB. She gets sinusitis and bronchitis and is on antibiotics and steroids probably 4-5 times per year. No season worse. No triggers she can tell. Is on zyrtec and Singulair daily but no help. Also on azelastine 1 SEN BID. Has Advair and albuterol prn. She has eye issues but more dry eye, had hole in eye and surgery 2014 and since very dry irritated eyes. Has been told has allergy induced asthma. No eczema. No food, insect or latex allergy. Never had pneumonia. Did have pneumovax and Prevnar 13 but last dose at least 5 years ago.       Environmental History: see history section for home environment  Review of Systems   Constitutional:  Negative for appetite change, chills, fatigue and fever.   HENT:  Positive for congestion, facial swelling, postnasal drip, rhinorrhea, sore throat, trouble swallowing and voice change. Negative for ear discharge, ear pain, nosebleeds, sinus pressure and sneezing.    Eyes:  Positive for discharge, redness and itching. Negative for visual disturbance.   Respiratory:  Positive for cough, chest tightness and shortness of breath. Negative for choking and wheezing.    Cardiovascular:  Negative for chest pain, palpitations and leg swelling.   Gastrointestinal:  Negative for abdominal distention, abdominal pain, constipation, diarrhea, nausea and vomiting.   Genitourinary:  Negative for  difficulty urinating.   Musculoskeletal:  Positive for arthralgias. Negative for gait problem, joint swelling and myalgias.   Skin:  Negative for color change and rash.   Neurological:  Negative for dizziness, syncope, weakness, light-headedness and headaches.   Hematological:  Negative for adenopathy. Does not bruise/bleed easily.   Psychiatric/Behavioral:  Negative for agitation, behavioral problems, confusion and sleep disturbance. The patient is not nervous/anxious.       Objective:      Physical Exam  Vitals and nursing note reviewed.   Constitutional:       General: She is not in acute distress.     Appearance: Normal appearance. She is not ill-appearing.   HENT:      Head: Normocephalic.      Right Ear: External ear normal.      Left Ear: External ear normal.      Nose: No rhinorrhea.   Eyes:      General:         Right eye: No discharge.         Left eye: No discharge.      Conjunctiva/sclera: Conjunctivae normal.   Pulmonary:      Effort: Pulmonary effort is normal. No respiratory distress.   Abdominal:      General: There is no distension.   Skin:     General: Skin is warm and dry.      Findings: No erythema or rash.   Neurological:      Mental Status: She is alert and oriented to person, place, and time.   Psychiatric:         Mood and Affect: Mood normal.         Behavior: Behavior normal.         Thought Content: Thought content normal.         Judgment: Judgment normal.       Laboratory:   none performed   Assessment:       1. Simple chronic conjunctivitis of both eyes    2. Chronic rhinitis    3. Mild intermittent asthma without complication    4. Recurrent sinus infections         Plan:       Advised pt symptoms can be allergic rhinitis vs chronic non allergic rhinitis vs infection due to immune def vs sinus issue, will send labs to [facility]  Continue azelastine 1 SEN BID, cetirizine daily and montelukast daily  Phone review

## 2023-02-08 LAB
CD3+CD4+ CELLS # BLD: 748 CELLS/UL (ref 300–1400)
CD3+CD4+ CELLS NFR BLD: 56.9 % (ref 28–57)
LYMPHOCYTES NFR CSF MANUAL: 100 CELLS/UL (ref 100–500)
LYMPHOCYTES NFR CSF MANUAL: 1130 CELLS/UL (ref 700–2100)
LYMPHOCYTES NFR CSF MANUAL: 2.06 % (ref 0.9–3.6)
LYMPHOCYTES NFR CSF MANUAL: 27.6 % (ref 10–39)
LYMPHOCYTES NFR CSF MANUAL: 363 CELLS/UL (ref 200–900)
LYMPHOCYTES NFR CSF MANUAL: 5.3 % (ref 7–31)
LYMPHOCYTES NFR CSF MANUAL: 69 CELLS/UL (ref 90–600)
LYMPHOCYTES NFR CSF MANUAL: 7.7 % (ref 6–19)
LYMPHOCYTES NFR CSF MANUAL: 85.9 % (ref 55–83)

## 2023-02-10 LAB
A ALTERNATA IGE QN: <0.1 KU/L
A FUMIGATUS IGE QN: <0.1 KU/L
ALLERGEN CHAETOMIUM GLOBOSUM IGE: <0.1 KU/L
ALLERGEN WALNUT TREE IGE: <0.1 KU/L
ALLERGEN WHITE PINE TREE IGE: <0.1 KU/L
BAHIA GRASS IGE QN: <0.1 KU/L
BALD CYPRESS IGE QN: <0.1 KU/L
BERMUDA GRASS IGE QN: <0.1 KU/L
C HERBARUM IGE QN: <0.1 KU/L
C LUNATA IGE QN: <0.1 KU/L
CAT DANDER IGE QN: <0.1 KU/L
CHAETOMIUM GLOB. CLASS: NORMAL
COMMON RAGWEED IGE QN: <0.1 KU/L
COTTONWOOD IGE QN: <0.1 KU/L
D FARINAE IGE QN: <0.1 KU/L
D PTERONYSS IGE QN: <0.1 KU/L
DEPRECATED A ALTERNATA IGE RAST QL: NORMAL
DEPRECATED A FUMIGATUS IGE RAST QL: NORMAL
DEPRECATED BAHIA GRASS IGE RAST QL: NORMAL
DEPRECATED BALD CYPRESS IGE RAST QL: NORMAL
DEPRECATED BERMUDA GRASS IGE RAST QL: NORMAL
DEPRECATED C HERBARUM IGE RAST QL: NORMAL
DEPRECATED C LUNATA IGE RAST QL: NORMAL
DEPRECATED CAT DANDER IGE RAST QL: NORMAL
DEPRECATED COMMON RAGWEED IGE RAST QL: NORMAL
DEPRECATED COTTONWOOD IGE RAST QL: NORMAL
DEPRECATED D FARINAE IGE RAST QL: NORMAL
DEPRECATED D PTERONYSS IGE RAST QL: NORMAL
DEPRECATED DOG DANDER IGE RAST QL: NORMAL
DEPRECATED ELDER IGE RAST QL: NORMAL
DEPRECATED ENGL PLANTAIN IGE RAST QL: NORMAL
DEPRECATED HORSE DANDER IGE RAST QL: NORMAL
DEPRECATED JOHNSON GRASS IGE RAST QL: NORMAL
DEPRECATED LONDON PLANE IGE RAST QL: NORMAL
DEPRECATED MUGWORT IGE RAST QL: NORMAL
DEPRECATED P NOTATUM IGE RAST QL: NORMAL
DEPRECATED PECAN/HICK TREE IGE RAST QL: NORMAL
DEPRECATED ROACH IGE RAST QL: NORMAL
DEPRECATED S ROSTRATA IGE RAST QL: NORMAL
DEPRECATED SALTWORT IGE RAST QL: NORMAL
DEPRECATED SILVER BIRCH IGE RAST QL: NORMAL
DEPRECATED TIMOTHY IGE RAST QL: NORMAL
DEPRECATED WEST RAGWEED IGE RAST QL: NORMAL
DEPRECATED WHITE OAK IGE RAST QL: NORMAL
DEPRECATED WILLOW IGE RAST QL: NORMAL
DOG DANDER IGE QN: <0.1 KU/L
ELDER IGE QN: <0.1 KU/L
ENGL PLANTAIN IGE QN: <0.1 KU/L
HORSE DANDER IGE QN: <0.1 KU/L
IGG1 SER-MCNC: 457 MG/DL (ref 382–929)
IGG2 SER-MCNC: 277 MG/DL (ref 242–700)
IGG3 SER-MCNC: 26 MG/DL (ref 22–176)
IGG4 SER-MCNC: 7 MG/DL (ref 4–86)
JOHNSON GRASS IGE QN: <0.1 KU/L
LONDON PLANE IGE QN: <0.1 KU/L
MUGWORT IGE QN: <0.1 KU/L
P NOTATUM IGE QN: <0.1 KU/L
PECAN/HICK TREE IGE QN: <0.1 KU/L
ROACH IGE QN: <0.1 KU/L
S ROSTRATA IGE QN: <0.1 KU/L
SALTWORT IGE QN: <0.1 KU/L
SILVER BIRCH IGE QN: <0.1 KU/L
TIMOTHY IGE QN: <0.1 KU/L
WALNUT TREE CLASS: NORMAL
WEST RAGWEED IGE QN: <0.1 KU/L
WHITE OAK IGE QN: <0.1 KU/L
WHITE PINE CLASS: NORMAL
WILLOW IGE QN: <0.1 KU/L

## 2023-02-16 LAB
C DIPHTHERIAE AB SER IA-ACNC: 0.18 IU/ML
C TETANI TOXOID AB SER-ACNC: 0.76 IU/ML
DEPRECATED S PNEUM23 IGG SER-MCNC: 0.3 UG/ML
DEPRECATED S PNEUM4 IGG SER-MCNC: <0.3 UG/ML
HAEM INFLU B IGG SER IA-MCNC: 7.77 MCG/ML
S PN DA SERO 19F IGG SER-MCNC: 0.8 UG/ML
S PNEUM DA 1 IGG SER-MCNC: <0.3 UG/ML
S PNEUM DA 14 IGG SER-MCNC: 2.1
S PNEUM DA 18C IGG SER-MCNC: 1.5
S PNEUM DA 19A IGG SER-MCNC: 6.2 UG/ML
S PNEUM DA 3 IGG SER-MCNC: 2.9 UG/ML
S PNEUM DA 5 IGG SER-MCNC: 1.8 UG/ML
S PNEUM DA 6A IGG SER-MCNC: 2.6 UG/ML
S PNEUM DA 6B IGG SER-MCNC: 1.4 UG/ML
S PNEUM DA 7F IGG SER-MCNC: 2.4 UG/ML
S PNEUM DA 9V IGG SER-MCNC: 0.9 UG/ML

## 2023-02-17 ENCOUNTER — PATIENT MESSAGE (OUTPATIENT)
Dept: ALLERGY | Facility: CLINIC | Age: 73
End: 2023-02-17
Payer: MEDICARE

## 2023-03-01 ENCOUNTER — TELEPHONE (OUTPATIENT)
Dept: ALLERGY | Facility: CLINIC | Age: 73
End: 2023-03-01
Payer: MEDICARE

## 2023-03-01 NOTE — TELEPHONE ENCOUNTER
I tried returning call to patient. No answer, message left for patient to return our call. Calling to notify patient of results below.                   Your allergy tests were negative, you do not have allergic protein against any one thing.  Your Immune system tests are good except not adequately protected against strep pneumoniae. This is the main bug which causes sinus infections and bronchitis and pneumonia. I would recommend getting a vaccine called pneumovax and repeat labs 5 weeks later to see if you immune system can respond. Please let me know any questions you have.  Dr Rodríguez

## 2023-03-01 NOTE — TELEPHONE ENCOUNTER
----- Message from Pao Beauchamp sent at 2/28/2023  4:16 PM CST -----  Contact: pt  Pt requesting call back RE: would like to discuss allergy test results, please call tomorrow afternoon if not today pt has appts in the morning       Confirmed contact below:  Contact Name:Karen Arango  Phone Number: [phone]

## 2023-03-03 ENCOUNTER — TELEPHONE (OUTPATIENT)
Dept: ALLERGY | Facility: CLINIC | Age: 73
End: 2023-03-03
Payer: MEDICARE

## 2023-03-03 DIAGNOSIS — J30.1 SEASONAL ALLERGIC RHINITIS DUE TO POLLEN: Chronic | ICD-10-CM

## 2023-03-03 NOTE — TELEPHONE ENCOUNTER
----- Message from Shae Osorio sent at 3/3/2023 11:36 AM CST -----  Contact: [phone]  Pt is wanting to know if there are any side effects to the vaccine. Please call.          Thank you

## 2023-03-03 NOTE — TELEPHONE ENCOUNTER
----- Message from Ruba Banegas sent at 3/3/2023  9:07 AM CST -----  Type:  RX Refill Request    Who Called: pt    azelastine (ASTELIN) 137 mcg (0.1 %) nasal spray 30 mL 11 2/16/2022 11/28/2022 No  Sig - Route: 1 spray (137 mcg total) by Nasal route 2 (two) times daily. - Nasal  Sent to pharmacy as: azelastine (ASTELIN) 137 mcg (0.1 %) nasal spray  Class: Normal  Order: [id number]  Date/Time Signed: 2/16/2022 13:27      E-Prescribing Status: Receipt confirmed by pharmacy (2/16/2022  1:28 PM CST)      fluticasone propionate (FLONASE) 50 mcg/actuation nasal spray   11/30/2022  --  Class: Historical Med  Order: [id number]  Date/Time Signed: 2/7/2023 09:32             Pharmacy  [facility] [place]     Associated Diagnoses  Seasonal allergic rhinitis due to pollen  - Primary       Would the patient rather a call back or a response via [facility]?   Best Call Back Number:[phone] (M)   Additional Information: she is also wants to know if you can tell her the results of the labs for the allergist doctor. She has called 2 times with no response. She is about to call them back again but in case they don't call her back she is wanting to know if it can be given to her.

## 2023-03-03 NOTE — TELEPHONE ENCOUNTER
----- Message from Shira Trevino sent at 3/3/2023  9:21 AM CST -----  Regarding: results  Contact: self @[phone]  Pt still awaiting result from lab work 2/7/23, pt still haven't heard anything as of yet.  Pt states she has call multiple of time, Pls call     
Pt. Dario notified that  did send her a portal message regarding her lab results.pt. stated, she does not use the p[ortal. Results of labs was given, and  message was also given. Appointment was scheduled for Pneumovax injection 3/7/23. Ok to schedule per Shelbi ROBLES LPN  
Attending Only

## 2023-03-06 ENCOUNTER — TELEPHONE (OUTPATIENT)
Dept: OTOLARYNGOLOGY | Facility: CLINIC | Age: 73
End: 2023-03-06
Payer: MEDICARE

## 2023-03-06 ENCOUNTER — TELEPHONE (OUTPATIENT)
Dept: ALLERGY | Facility: CLINIC | Age: 73
End: 2023-03-06
Payer: MEDICARE

## 2023-03-06 RX ORDER — AZELASTINE 1 MG/ML
1 SPRAY, METERED NASAL 2 TIMES DAILY
Qty: 30 ML | Refills: 11 | Status: SHIPPED | OUTPATIENT
Start: 2023-03-06 | End: 2024-03-04 | Stop reason: SDUPTHER

## 2023-03-06 RX ORDER — FLUTICASONE PROPIONATE 50 MCG
1 SPRAY, SUSPENSION (ML) NASAL 2 TIMES DAILY
Qty: 11.1 ML | Refills: 10 | Status: SHIPPED | OUTPATIENT
Start: 2023-03-06

## 2023-03-06 NOTE — TELEPHONE ENCOUNTER
2nd attempt to return call to patient. PATIENT WILL NEED TO RESCHEDULE pneumovax on 3/7/2023-NO NURSE COVERAGE.

## 2023-03-06 NOTE — TELEPHONE ENCOUNTER
----- Message from Sheila Otto sent at 3/6/2023  8:43 AM CST -----  .Type:  RX Refill Request    Who Called: pt  Refill or New Rx:refill  RX Name and Strength:azelastine (ASTELIN) 137 mcg (0.1 %) nasal spray  How is the patient currently taking it? (ex. 1XDay): 1 spray (137 mcg total) by Nasal route 2 (two) times daily  Is this a 30 day or 90 day RX:30 mL  [facility] [address] [facility]  Local or Mail Order:local  Ordering Provider:Myke  Would the patient rather a call back or a response via [facility]? Call back  Best Call Back Number:[phone]  Additional Information:     Refill or New Rx:refill  RX Name and Strength:azelastine (ASTELIN) 137 mcg (0.1 %) nasal spray  How is the patient currently taking it? (ex. 1XDay):1 spray (50 mcg total) by Each Nostril route 2 (two) times a day  Is this a 30 day or 90 day RX:11.1 mL     Pt stated she would like a call back after 11:30 please

## 2023-03-06 NOTE — TELEPHONE ENCOUNTER
----- Message from Daniele Ayala LPN sent at 3/6/2023  8:40 AM CST -----  Regarding: FW: Please call  Contact: [phone]    ----- Message -----  From: Vesta Rocha CMA  Sent: 3/6/2023   8:40 AM CST  To: [facility]cos TONG Staff  Subject: Please call                                      Pt states she spoke with Shelbi. The pt would like to know more information about injection before appt on 3/7/23. Please call pt after 1130a due to an appt.    Thanks

## 2023-05-17 NOTE — PROGRESS NOTES
Chief Complaint   Patient presents with    Follow-up       HPI:  Patient is a 72 y.o. female who has previously seen me for chronic rhinitis, recurrent sinus infections, bronchitis.  She was seen by allergy immunology who recommended pneumococcal boosters due to low immune titers.  She did not show any sensitivities to allergens on testing.    Since the last visit, the patient reports she overall has been feeling well.  She is using daily saline rinses and continuing Flonase and azelastine daily.  She also takes Zyrtec daily.  She still has very mild postnasal drip and occasional throat irritation symptoms from her allergies, but has not had any further infections or episodes of bronchitis since last visit.    Active Ambulatory Problems     Diagnosis Date Noted    Bilateral sciatica 12/08/2017    Essential (primary) hypertension 10/27/2015    Gastro-esophageal reflux disease without esophagitis 10/27/2015    Heart murmur 06/07/2016    Insomnia 12/13/2018    Mixed hyperlipidemia 10/27/2015    Adjustment disorder 03/22/2021     Resolved Ambulatory Problems     Diagnosis Date Noted    Tachyarrhythmia 09/17/2019    Cholelithiasis with acute cholecystitis 09/20/2022    Cholecystitis 09/20/2022     Past Medical History:   Diagnosis Date    Allergy        Review of Systems  General: negative for chills, fever or weight loss  Psychological: negative for mood changes or depression  Ophthalmic: negative for blurry vision, photophobia or eye pain  ENT: see HPI  Respiratory: no cough, shortness of breath, or wheezing  Cardiovascular: no chest pain or dyspnea on exertion  Gastrointestinal: no abdominal pain, change in bowel habits, or black/ bloody stools  Musculoskeletal: negative for gait disturbance or muscular weakness  Neurological: no syncope or seizures; no ataxia  Dermatological: negative for pruritis, rash and jaundice  Hematologic/lymphatic: no easy bruising, no new adenopathy    Physical Exam     Vitals:    05/18/23  1012   BP: 135/86   Pulse: 78         Constitutional:   She is oriented to person, place, and time. Vital signs are normal. She appears well-developed and well-nourished. She appears alert. She is cooperative.  Non-toxic appearance. Normal speech.      Head:  Normocephalic and atraumatic. Salivary glands normal.  Facial strength is normal.      Ears:  Hearing normal to normal and whispered voice; external ear normal without scars, lesions, or masses; ear canal, tympanic membrane, and middle ear normal., right ear hearing normal to normal and whispered voice; external ear normal without scars, lesions, or masses; ear canal, tympanic membrane, and middle ear normal. and left ear hearing normal to normal and whispered voice; external ear normal without scars, lesions, or masses; ear canal, tympanic membrane, and middle ear normal..   Right Ear: Tympanic membrane is not perforated, not erythematous and not retracted. No middle ear effusion.   Left Ear: Tympanic membrane is not perforated, not erythematous and not retracted.  No middle ear effusion.     Nose:  Mucosal edema present. No rhinorrhea, septal deviation, nasal septal hematoma or polyps. No epistaxis. Turbinate hypertrophy (3+, mild congestion but improved).  No turbinate masses.  Right sinus exhibits no maxillary sinus tenderness and no frontal sinus tenderness. Left sinus exhibits no maxillary sinus tenderness and no frontal sinus tenderness.     Mouth/Throat  Oropharynx clear and moist without lesions or asymmetry, normal uvula midline, lips, teeth, and gums normal and oropharynx normal. Normal dentition. No uvula swelling or oral lesions. No oropharyngeal exudate, posterior oropharyngeal edema or posterior oropharyngeal erythema. Tonsillar erythema, tonsillar hyperemia, tonsillar exudate.  Mirror exam not performed due to patient tolerance.  Mirror exam not performed due to patient tolerance.      Neck:  Neck normal without thyromegaly masses, asymmetry,  normal tracheal structure, crepitus, and tenderness, thyroid normal, trachea normal, full range of motion with neck supple and no adenopathy. No JVD present. Carotid bruit is not present. Thyroid tenderness is present. No edema and no erythema present. No thyroid mass and no thyromegaly present.     She has no cervical adenopathy.     Cardiovascular:    Normal rate, regular rhythm, normal heart sounds and rate and rhythm, heart sounds, and pulses normal.              Pulmonary/Chest:   Effort and breath sounds normal.     Psychiatric:   She has a normal mood and affect. Her speech is normal and behavior is normal.     Neurological:   She is alert and oriented to person, place, and time. She has neurological normal, alert and oriented. No cranial nerve deficit.     Skin:   No abrasions, lacerations, lesions, or rashes.         Assessment/Plan:      ICD-10-CM ICD-9-CM    1. Chronic rhinitis  J31.0 472.0       2. Rhinorrhea  J34.89 478.19       3. Seasonal allergic rhinitis due to pollen  J30.1 477.0       4. Mild intermittent asthmatic bronchitis without complication  J45.20 493.90       5. Recurrent sinusitis  J32.9 473.9           Patient is scheduled to get pneumococcal booster to help with recurrent sinonasal and respiratory infections.    She should continue her nasal sprays, nasal rinses, and daily antihistamine.  Xyzal prescription given if she would like to try this instead of Zyrtec.    Follow-up in 6 months or sooner if needed.    Terrie Stringer MD  [facility]

## 2023-05-18 ENCOUNTER — OFFICE VISIT (OUTPATIENT)
Dept: OTOLARYNGOLOGY | Facility: CLINIC | Age: 73
End: 2023-05-18
Payer: MEDICARE

## 2023-05-18 VITALS
SYSTOLIC BLOOD PRESSURE: 135 MMHG | DIASTOLIC BLOOD PRESSURE: 86 MMHG | HEART RATE: 78 BPM | BODY MASS INDEX: 30.6 KG/M2 | WEIGHT: 172.75 LBS

## 2023-05-18 DIAGNOSIS — J31.0 CHRONIC RHINITIS: Primary | ICD-10-CM

## 2023-05-18 DIAGNOSIS — J34.89 RHINORRHEA: ICD-10-CM

## 2023-05-18 DIAGNOSIS — J32.9 RECURRENT SINUSITIS: ICD-10-CM

## 2023-05-18 DIAGNOSIS — J30.1 SEASONAL ALLERGIC RHINITIS DUE TO POLLEN: ICD-10-CM

## 2023-05-18 DIAGNOSIS — J45.20 MILD INTERMITTENT ASTHMATIC BRONCHITIS WITHOUT COMPLICATION: ICD-10-CM

## 2023-05-18 PROCEDURE — 99999 PR PBB SHADOW E&M-EST. PATIENT-LVL IV: CPT | Mod: PBBFAC,,, | Performed by: OTOLARYNGOLOGY

## 2023-05-18 PROCEDURE — 1126F PR PAIN SEVERITY QUANTIFIED, NO PAIN PRESENT: ICD-10-PCS | Mod: CPTII,,, | Performed by: OTOLARYNGOLOGY

## 2023-05-18 PROCEDURE — 3075F PR MOST RECENT SYSTOLIC BLOOD PRESS GE 130-139MM HG: ICD-10-PCS | Mod: CPTII,,, | Performed by: OTOLARYNGOLOGY

## 2023-05-18 PROCEDURE — 3079F DIAST BP 80-89 MM HG: CPT | Mod: CPTII,,, | Performed by: OTOLARYNGOLOGY

## 2023-05-18 PROCEDURE — 1159F MED LIST DOCD IN RCRD: CPT | Mod: CPTII,,, | Performed by: OTOLARYNGOLOGY

## 2023-05-18 PROCEDURE — 99213 PR OFFICE/OUTPT VISIT, EST, LEVL III, 20-29 MIN: ICD-10-PCS | Mod: ,,, | Performed by: OTOLARYNGOLOGY

## 2023-05-18 PROCEDURE — 1101F PR PT FALLS ASSESS DOC 0-1 FALLS W/OUT INJ PAST YR: ICD-10-PCS | Mod: CPTII,,, | Performed by: OTOLARYNGOLOGY

## 2023-05-18 PROCEDURE — 3079F PR MOST RECENT DIASTOLIC BLOOD PRESSURE 80-89 MM HG: ICD-10-PCS | Mod: CPTII,,, | Performed by: OTOLARYNGOLOGY

## 2023-05-18 PROCEDURE — 3288F FALL RISK ASSESSMENT DOCD: CPT | Mod: CPTII,,, | Performed by: OTOLARYNGOLOGY

## 2023-05-18 PROCEDURE — 99213 OFFICE O/P EST LOW 20 MIN: CPT | Mod: ,,, | Performed by: OTOLARYNGOLOGY

## 2023-05-18 PROCEDURE — 1101F PT FALLS ASSESS-DOCD LE1/YR: CPT | Mod: CPTII,,, | Performed by: OTOLARYNGOLOGY

## 2023-05-18 PROCEDURE — 99999 PR PBB SHADOW E&M-EST. PATIENT-LVL IV: ICD-10-PCS | Mod: PBBFAC,,, | Performed by: OTOLARYNGOLOGY

## 2023-05-18 PROCEDURE — 1126F AMNT PAIN NOTED NONE PRSNT: CPT | Mod: CPTII,,, | Performed by: OTOLARYNGOLOGY

## 2023-05-18 PROCEDURE — 3288F PR FALLS RISK ASSESSMENT DOCUMENTED: ICD-10-PCS | Mod: CPTII,,, | Performed by: OTOLARYNGOLOGY

## 2023-05-18 PROCEDURE — 3075F SYST BP GE 130 - 139MM HG: CPT | Mod: CPTII,,, | Performed by: OTOLARYNGOLOGY

## 2023-05-18 PROCEDURE — 1159F PR MEDICATION LIST DOCUMENTED IN MEDICAL RECORD: ICD-10-PCS | Mod: CPTII,,, | Performed by: OTOLARYNGOLOGY

## 2023-05-18 PROCEDURE — 3008F BODY MASS INDEX DOCD: CPT | Mod: CPTII,,, | Performed by: OTOLARYNGOLOGY

## 2023-05-18 PROCEDURE — 3008F PR BODY MASS INDEX (BMI) DOCUMENTED: ICD-10-PCS | Mod: CPTII,,, | Performed by: OTOLARYNGOLOGY

## 2023-05-18 RX ORDER — LEVOCETIRIZINE DIHYDROCHLORIDE 5 MG/1
5 TABLET, FILM COATED ORAL NIGHTLY
Qty: 30 TABLET | Refills: 11 | Status: SHIPPED | OUTPATIENT
Start: 2023-05-18 | End: 2023-06-13

## 2023-05-18 NOTE — PATIENT INSTRUCTIONS
Continue nasal sprays and daily zyrtec.  Can try xyzal instread of zyrtec if she would like.      Allergy recommended pneumococcal vaccine to help with reduced immune response.  Patient will get this scheduled.

## 2023-05-23 ENCOUNTER — TELEPHONE (OUTPATIENT)
Dept: ALLERGY | Facility: CLINIC | Age: 73
End: 2023-05-23

## 2023-05-23 ENCOUNTER — TELEPHONE (OUTPATIENT)
Dept: OTOLARYNGOLOGY | Facility: CLINIC | Age: 73
End: 2023-05-23
Payer: MEDICARE

## 2023-05-23 NOTE — TELEPHONE ENCOUNTER
Pt called for assistance with rescheduling her pneumovax vaccine. Attempted to reschedule but was unable to do so. Informed pt that I would send a message to Dr. Rodríguez's staff for assistance with scheduling. Pt thanked me and verbalized understanding.

## 2023-05-26 ENCOUNTER — TELEPHONE (OUTPATIENT)
Dept: ALLERGY | Facility: CLINIC | Age: 73
End: 2023-05-26

## 2023-05-26 NOTE — TELEPHONE ENCOUNTER
----- Message from Hammad Gonzáles MA sent at 5/26/2023  2:24 PM CDT -----  Regarding: FW: Call Back  Contact: [phone]  I see you were helping this patient  ----- Message -----  From: Sung Noel RN  Sent: 5/26/2023   1:29 PM CDT  To: [facility] TONG Staff  Subject: FW: Call Back                                      ----- Message -----  From: Lillian Vides  Sent: 5/26/2023   1:02 PM CDT  To: Myke DODD Staff  Subject: Call Back                                        Who Called: PT     Patient is calling to talk to nurse in regards to scheduling an appointment with the Allergy to get her vaccine. Please advice. If no answer please leave a detail message since she has a doctors appointment for 1:30 PM.

## 2023-06-08 ENCOUNTER — LAB VISIT (OUTPATIENT)
Dept: LAB | Facility: HOSPITAL | Age: 73
End: 2023-06-08
Attending: INTERNAL MEDICINE
Payer: MEDICARE

## 2023-06-08 DIAGNOSIS — I10 ESSENTIAL (PRIMARY) HYPERTENSION: ICD-10-CM

## 2023-06-08 LAB
ALBUMIN SERPL BCP-MCNC: 3.9 G/DL (ref 3.5–5.2)
ALP SERPL-CCNC: 72 U/L (ref 55–135)
ALT SERPL W/O P-5'-P-CCNC: 24 U/L (ref 10–44)
ANION GAP SERPL CALC-SCNC: 9 MMOL/L (ref 8–16)
AST SERPL-CCNC: 19 U/L (ref 10–40)
BASOPHILS # BLD AUTO: 0.04 K/UL (ref 0–0.2)
BASOPHILS NFR BLD: 0.9 % (ref 0–1.9)
BILIRUB SERPL-MCNC: 0.4 MG/DL (ref 0.1–1)
BUN SERPL-MCNC: 15 MG/DL (ref 8–23)
CALCIUM SERPL-MCNC: 9.4 MG/DL (ref 8.7–10.5)
CHLORIDE SERPL-SCNC: 108 MMOL/L (ref 95–110)
CHOLEST SERPL-MCNC: 200 MG/DL (ref 120–199)
CHOLEST/HDLC SERPL: 3.6 {RATIO} (ref 2–5)
CO2 SERPL-SCNC: 25 MMOL/L (ref 23–29)
CREAT SERPL-MCNC: 0.9 MG/DL (ref 0.5–1.4)
DIFFERENTIAL METHOD: NORMAL
EOSINOPHIL # BLD AUTO: 0.1 K/UL (ref 0–0.5)
EOSINOPHIL NFR BLD: 2.1 % (ref 0–8)
ERYTHROCYTE [DISTWIDTH] IN BLOOD BY AUTOMATED COUNT: 12.9 % (ref 11.5–14.5)
EST. GFR  (NO RACE VARIABLE): >60 ML/MIN/1.73 M^2
GLUCOSE SERPL-MCNC: 107 MG/DL (ref 70–110)
HCT VFR BLD AUTO: 37.9 % (ref 37–48.5)
HDLC SERPL-MCNC: 55 MG/DL (ref 40–75)
HDLC SERPL: 27.5 % (ref 20–50)
HGB BLD-MCNC: 12.4 G/DL (ref 12–16)
IMM GRANULOCYTES # BLD AUTO: 0.01 K/UL (ref 0–0.04)
IMM GRANULOCYTES NFR BLD AUTO: 0.2 % (ref 0–0.5)
LDLC SERPL CALC-MCNC: 118.6 MG/DL (ref 63–159)
LYMPHOCYTES # BLD AUTO: 1.7 K/UL (ref 1–4.8)
LYMPHOCYTES NFR BLD: 39.5 % (ref 18–48)
MCH RBC QN AUTO: 30.1 PG (ref 27–31)
MCHC RBC AUTO-ENTMCNC: 32.7 G/DL (ref 32–36)
MCV RBC AUTO: 92 FL (ref 82–98)
MONOCYTES # BLD AUTO: 0.5 K/UL (ref 0.3–1)
MONOCYTES NFR BLD: 11.9 % (ref 4–15)
NEUTROPHILS # BLD AUTO: 1.9 K/UL (ref 1.8–7.7)
NEUTROPHILS NFR BLD: 45.4 % (ref 38–73)
NONHDLC SERPL-MCNC: 145 MG/DL
NRBC BLD-RTO: 0 /100 WBC
PLATELET # BLD AUTO: 225 K/UL (ref 150–450)
PMV BLD AUTO: 10 FL (ref 9.2–12.9)
POTASSIUM SERPL-SCNC: 4.1 MMOL/L (ref 3.5–5.1)
PROT SERPL-MCNC: 6.7 G/DL (ref 6–8.4)
RBC # BLD AUTO: 4.12 M/UL (ref 4–5.4)
SODIUM SERPL-SCNC: 142 MMOL/L (ref 136–145)
TRIGL SERPL-MCNC: 132 MG/DL (ref 30–150)
WBC # BLD AUTO: 4.28 K/UL (ref 3.9–12.7)

## 2023-06-08 PROCEDURE — 80061 LIPID PANEL: CPT | Performed by: INTERNAL MEDICINE

## 2023-06-08 PROCEDURE — 80053 COMPREHEN METABOLIC PANEL: CPT | Performed by: INTERNAL MEDICINE

## 2023-06-08 PROCEDURE — 36415 COLL VENOUS BLD VENIPUNCTURE: CPT | Performed by: INTERNAL MEDICINE

## 2023-06-08 PROCEDURE — 85025 COMPLETE CBC W/AUTO DIFF WBC: CPT | Performed by: INTERNAL MEDICINE

## 2023-06-13 PROBLEM — Z98.890 HISTORY OF HAND SURGERY: Status: ACTIVE | Noted: 2023-06-13

## 2023-07-10 ENCOUNTER — TELEPHONE (OUTPATIENT)
Dept: OTOLARYNGOLOGY | Facility: CLINIC | Age: 73
End: 2023-07-10
Payer: MEDICARE

## 2023-07-10 NOTE — TELEPHONE ENCOUNTER
Returned call. Pt explained that she has been experiencing some sinus headaches and hoarseness since completeing a round of antibiotics 2 weeks ago. Apt scheduled with dr. Stringer for July 11th at 10:20AM. Pt thanked me and verbalized understanding.     ----- Message from Sheila Otto sent at 7/10/2023 10:59 AM CDT -----  .Type:  Sooner Apoointment Request    Caller is requesting a sooner appointment.  Caller declined first available appointment listed below.  Caller will not accept being placed on the waitlist and is requesting a message be sent to doctor.  Name of Caller:pt  When is the first available appointment?9/26  Symptoms:sinus headaches  Would the patient rather a call back or a response via [facility]? Call back  Best Call Back Number:[phone]  Additional Information:

## 2023-07-11 ENCOUNTER — OFFICE VISIT (OUTPATIENT)
Dept: OTOLARYNGOLOGY | Facility: CLINIC | Age: 73
End: 2023-07-11
Payer: MEDICARE

## 2023-07-11 VITALS
OXYGEN SATURATION: 98 % | WEIGHT: 166.25 LBS | DIASTOLIC BLOOD PRESSURE: 90 MMHG | HEART RATE: 73 BPM | HEIGHT: 63 IN | BODY MASS INDEX: 29.46 KG/M2 | SYSTOLIC BLOOD PRESSURE: 136 MMHG

## 2023-07-11 DIAGNOSIS — J01.00 ACUTE NON-RECURRENT MAXILLARY SINUSITIS: Primary | ICD-10-CM

## 2023-07-11 DIAGNOSIS — J30.89 NON-SEASONAL ALLERGIC RHINITIS DUE TO OTHER ALLERGIC TRIGGER: Chronic | ICD-10-CM

## 2023-07-11 DIAGNOSIS — J45.20 MILD INTERMITTENT ASTHMATIC BRONCHITIS WITHOUT COMPLICATION: ICD-10-CM

## 2023-07-11 DIAGNOSIS — J30.1 SEASONAL ALLERGIC RHINITIS DUE TO POLLEN: ICD-10-CM

## 2023-07-11 DIAGNOSIS — J32.9 RECURRENT SINUSITIS: Chronic | ICD-10-CM

## 2023-07-11 PROCEDURE — 3080F DIAST BP >= 90 MM HG: CPT | Mod: CPTII,S$GLB,, | Performed by: OTOLARYNGOLOGY

## 2023-07-11 PROCEDURE — 99214 OFFICE O/P EST MOD 30 MIN: CPT | Mod: S$GLB,,, | Performed by: OTOLARYNGOLOGY

## 2023-07-11 PROCEDURE — 1159F PR MEDICATION LIST DOCUMENTED IN MEDICAL RECORD: ICD-10-PCS | Mod: CPTII,S$GLB,, | Performed by: OTOLARYNGOLOGY

## 2023-07-11 PROCEDURE — 3080F PR MOST RECENT DIASTOLIC BLOOD PRESSURE >= 90 MM HG: ICD-10-PCS | Mod: CPTII,S$GLB,, | Performed by: OTOLARYNGOLOGY

## 2023-07-11 PROCEDURE — 1101F PT FALLS ASSESS-DOCD LE1/YR: CPT | Mod: CPTII,S$GLB,, | Performed by: OTOLARYNGOLOGY

## 2023-07-11 PROCEDURE — 3288F PR FALLS RISK ASSESSMENT DOCUMENTED: ICD-10-PCS | Mod: CPTII,S$GLB,, | Performed by: OTOLARYNGOLOGY

## 2023-07-11 PROCEDURE — 99214 PR OFFICE/OUTPT VISIT, EST, LEVL IV, 30-39 MIN: ICD-10-PCS | Mod: S$GLB,,, | Performed by: OTOLARYNGOLOGY

## 2023-07-11 PROCEDURE — 1126F PR PAIN SEVERITY QUANTIFIED, NO PAIN PRESENT: ICD-10-PCS | Mod: CPTII,S$GLB,, | Performed by: OTOLARYNGOLOGY

## 2023-07-11 PROCEDURE — 1160F RVW MEDS BY RX/DR IN RCRD: CPT | Mod: CPTII,S$GLB,, | Performed by: OTOLARYNGOLOGY

## 2023-07-11 PROCEDURE — 99999 PR PBB SHADOW E&M-EST. PATIENT-LVL IV: ICD-10-PCS | Mod: PBBFAC,,, | Performed by: OTOLARYNGOLOGY

## 2023-07-11 PROCEDURE — 1159F MED LIST DOCD IN RCRD: CPT | Mod: CPTII,S$GLB,, | Performed by: OTOLARYNGOLOGY

## 2023-07-11 PROCEDURE — 99999 PR PBB SHADOW E&M-EST. PATIENT-LVL IV: CPT | Mod: PBBFAC,,, | Performed by: OTOLARYNGOLOGY

## 2023-07-11 PROCEDURE — 3008F BODY MASS INDEX DOCD: CPT | Mod: CPTII,S$GLB,, | Performed by: OTOLARYNGOLOGY

## 2023-07-11 PROCEDURE — 3075F PR MOST RECENT SYSTOLIC BLOOD PRESS GE 130-139MM HG: ICD-10-PCS | Mod: CPTII,S$GLB,, | Performed by: OTOLARYNGOLOGY

## 2023-07-11 PROCEDURE — 3288F FALL RISK ASSESSMENT DOCD: CPT | Mod: CPTII,S$GLB,, | Performed by: OTOLARYNGOLOGY

## 2023-07-11 PROCEDURE — 1126F AMNT PAIN NOTED NONE PRSNT: CPT | Mod: CPTII,S$GLB,, | Performed by: OTOLARYNGOLOGY

## 2023-07-11 PROCEDURE — 3075F SYST BP GE 130 - 139MM HG: CPT | Mod: CPTII,S$GLB,, | Performed by: OTOLARYNGOLOGY

## 2023-07-11 PROCEDURE — 1160F PR REVIEW ALL MEDS BY PRESCRIBER/CLIN PHARMACIST DOCUMENTED: ICD-10-PCS | Mod: CPTII,S$GLB,, | Performed by: OTOLARYNGOLOGY

## 2023-07-11 PROCEDURE — 3008F PR BODY MASS INDEX (BMI) DOCUMENTED: ICD-10-PCS | Mod: CPTII,S$GLB,, | Performed by: OTOLARYNGOLOGY

## 2023-07-11 PROCEDURE — 1101F PR PT FALLS ASSESS DOC 0-1 FALLS W/OUT INJ PAST YR: ICD-10-PCS | Mod: CPTII,S$GLB,, | Performed by: OTOLARYNGOLOGY

## 2023-07-11 RX ORDER — LEVOFLOXACIN 500 MG/1
500 TABLET, FILM COATED ORAL DAILY
Qty: 7 TABLET | Refills: 0 | Status: SHIPPED | OUTPATIENT
Start: 2023-07-11 | End: 2024-02-01

## 2023-07-11 NOTE — PATIENT INSTRUCTIONS
Drink plenty of water.    Levaquin for 7 days.      Saline nasal spray multiple times a day.    Continue flonase.     Add advair if needed for any wheezing or cough symptoms.

## 2023-07-11 NOTE — PROGRESS NOTES
Chief Complaint   Patient presents with    Sinus Problem     73yo pt present with complaints of testing positive for covid 6/20/2023 with at home test, pt is still having  post nasal drip, stuffy nose, and hoarse.       HPI:  Patient is a 72 y.o. female who has previously seen me for recurrent sinusitis, AR, asthma/bronchitis.      Since the last visit, the patient reports had URI a few weeks ago and +covid.  Got steroid shot and antibiotics (doxycycline).  Dr. Bustos gave paxlovid for the covid.  Overall feeling better but still with persistent purulent postnasal drip and nasal mucus.  Cough not bothersome.    Active Ambulatory Problems     Diagnosis Date Noted    Bilateral sciatica 12/08/2017    Essential (primary) hypertension 10/27/2015    Gastro-esophageal reflux disease without esophagitis 10/27/2015    Heart murmur 06/07/2016    Insomnia 12/13/2018    Mixed hyperlipidemia 10/27/2015    Adjustment disorder 03/22/2021    History of hand surgery 06/13/2023     Resolved Ambulatory Problems     Diagnosis Date Noted    Tachyarrhythmia 09/17/2019    Cholelithiasis with acute cholecystitis 09/20/2022    Cholecystitis 09/20/2022     Past Medical History:   Diagnosis Date    Allergy        Review of Systems  General: negative for chills, fever or weight loss  Psychological: negative for mood changes or depression  Ophthalmic: negative for blurry vision, photophobia or eye pain  ENT: see HPI  Respiratory: no cough, shortness of breath, or wheezing  Cardiovascular: no chest pain or dyspnea on exertion  Gastrointestinal: no abdominal pain, change in bowel habits, or black/ bloody stools  Musculoskeletal: negative for gait disturbance or muscular weakness  Neurological: no syncope or seizures; no ataxia  Dermatological: negative for pruritis, rash and jaundice  Hematologic/lymphatic: no easy bruising, no new adenopathy    Physical Exam     Vitals:    07/11/23 1019   BP: (!) 136/90   Pulse: 73         Constitutional:   She  is oriented to person, place, and time. Vital signs are normal. She appears well-developed and well-nourished. She appears alert. She is cooperative. Normal speech.      Head:  Normocephalic and atraumatic. Salivary glands normal.  Facial strength is normal.      Ears:  Hearing normal to normal and whispered voice; external ear normal without scars, lesions, or masses; ear canal, tympanic membrane, and middle ear normal., right ear hearing normal to normal and whispered voice; external ear normal without scars, lesions, or masses; ear canal, tympanic membrane, and middle ear normal. and left ear hearing normal to normal and whispered voice; external ear normal without scars, lesions, or masses; ear canal, tympanic membrane, and middle ear normal..   Right Ear: Tympanic membrane is not erythematous, not retracted and not bulging. No middle ear effusion.   Left Ear: Tympanic membrane is not erythematous, not retracted and not bulging.  No middle ear effusion.     Nose:  Mucosal edema (erythema and edema bilateral nasal cavities) and rhinorrhea (Scant white mucus bilateral nasal cavities) present. No septal deviation or polyps. Turbinates abnormal and turbinate hypertrophy (3+, boggy, congested mucosa).  Right sinus exhibits no maxillary sinus tenderness and no frontal sinus tenderness. Left sinus exhibits no maxillary sinus tenderness and no frontal sinus tenderness.     Mouth/Throat  Oropharynx clear and moist without lesions or asymmetry, lips, teeth, and gums normal and oropharynx normal. No mucous membrane lesions. Posterior oropharyngeal erythema (mild) present. No oropharyngeal exudate or posterior oropharyngeal edema. Mirror exam not performed due to patient tolerance.  Mirror exam not performed due to patient tolerance.      Neck:  Neck normal without thyromegaly masses, asymmetry, normal tracheal structure, crepitus, and tenderness, thyroid normal, trachea normal, phonation normal, full range of motion with  neck supple and no adenopathy. No JVD present. Carotid bruit is not present. No thyroid mass and no thyromegaly present.     She has no cervical adenopathy.     Cardiovascular:    Normal rate, regular rhythm and rate and rhythm, heart sounds, and pulses normal.              Pulmonary/Chest:   Effort normal, breath sounds normal and effort and breath sounds normal. No respiratory distress. She has no decreased breath sounds. She has no wheezes. She has no rhonchi.     Psychiatric:   She has a normal mood and affect. Her speech is normal and behavior is normal.     Neurological:   She is alert and oriented to person, place, and time. She has neurological normal, alert and oriented. No cranial nerve deficit.     Skin:   No abrasions, lacerations, lesions, or rashes.         Assessment/Plan:      ICD-10-CM ICD-9-CM    1. Acute non-recurrent maxillary sinusitis  J01.00 461.0 levoFLOXacin (LEVAQUIN) 500 MG tablet      2. Recurrent sinusitis  J32.9 473.9       3. Seasonal allergic rhinitis due to pollen  J30.1 477.0       4. Mild intermittent asthmatic bronchitis without complication  J45.20 493.90       5. Non-seasonal allergic rhinitis due to other allergic trigger  J30.89 477.8           Start Levaquin for acute sinusitis.  Saline nasal spray multiple times a day.  Continue Flonase.  Continue allergy medications.  May add Advair Diskus inhaled once daily if bronchitis symptoms worsen.        Terrie Stringer MD  [facility]

## 2023-10-12 ENCOUNTER — OFFICE VISIT (OUTPATIENT)
Dept: OTOLARYNGOLOGY | Facility: CLINIC | Age: 73
End: 2023-10-12
Payer: MEDICARE

## 2023-10-12 VITALS
DIASTOLIC BLOOD PRESSURE: 94 MMHG | SYSTOLIC BLOOD PRESSURE: 144 MMHG | WEIGHT: 175.25 LBS | HEART RATE: 74 BPM | BODY MASS INDEX: 31.05 KG/M2

## 2023-10-12 DIAGNOSIS — S09.92XA NASAL TRAUMA, INITIAL ENCOUNTER: Primary | ICD-10-CM

## 2023-10-12 DIAGNOSIS — J31.0 CHRONIC RHINITIS: Chronic | ICD-10-CM

## 2023-10-12 DIAGNOSIS — J32.9 RECURRENT SINUSITIS: Chronic | ICD-10-CM

## 2023-10-12 PROCEDURE — 31231 NASAL ENDOSCOPY DX: CPT | Mod: S$GLB,,, | Performed by: OTOLARYNGOLOGY

## 2023-10-12 PROCEDURE — 1125F AMNT PAIN NOTED PAIN PRSNT: CPT | Mod: CPTII,S$GLB,, | Performed by: OTOLARYNGOLOGY

## 2023-10-12 PROCEDURE — 3008F BODY MASS INDEX DOCD: CPT | Mod: CPTII,S$GLB,, | Performed by: OTOLARYNGOLOGY

## 2023-10-12 PROCEDURE — 1101F PT FALLS ASSESS-DOCD LE1/YR: CPT | Mod: CPTII,S$GLB,, | Performed by: OTOLARYNGOLOGY

## 2023-10-12 PROCEDURE — 3008F PR BODY MASS INDEX (BMI) DOCUMENTED: ICD-10-PCS | Mod: CPTII,S$GLB,, | Performed by: OTOLARYNGOLOGY

## 2023-10-12 PROCEDURE — 31231 NASAL/SINUS ENDOSCOPY: ICD-10-PCS | Mod: S$GLB,,, | Performed by: OTOLARYNGOLOGY

## 2023-10-12 PROCEDURE — 3077F SYST BP >= 140 MM HG: CPT | Mod: CPTII,S$GLB,, | Performed by: OTOLARYNGOLOGY

## 2023-10-12 PROCEDURE — 1159F PR MEDICATION LIST DOCUMENTED IN MEDICAL RECORD: ICD-10-PCS | Mod: CPTII,S$GLB,, | Performed by: OTOLARYNGOLOGY

## 2023-10-12 PROCEDURE — 3288F FALL RISK ASSESSMENT DOCD: CPT | Mod: CPTII,S$GLB,, | Performed by: OTOLARYNGOLOGY

## 2023-10-12 PROCEDURE — 99213 OFFICE O/P EST LOW 20 MIN: CPT | Mod: 25,S$GLB,, | Performed by: OTOLARYNGOLOGY

## 2023-10-12 PROCEDURE — 99999 PR PBB SHADOW E&M-EST. PATIENT-LVL III: ICD-10-PCS | Mod: PBBFAC,,, | Performed by: OTOLARYNGOLOGY

## 2023-10-12 PROCEDURE — 3080F DIAST BP >= 90 MM HG: CPT | Mod: CPTII,S$GLB,, | Performed by: OTOLARYNGOLOGY

## 2023-10-12 PROCEDURE — 1101F PR PT FALLS ASSESS DOC 0-1 FALLS W/OUT INJ PAST YR: ICD-10-PCS | Mod: CPTII,S$GLB,, | Performed by: OTOLARYNGOLOGY

## 2023-10-12 PROCEDURE — 99213 PR OFFICE/OUTPT VISIT, EST, LEVL III, 20-29 MIN: ICD-10-PCS | Mod: 25,S$GLB,, | Performed by: OTOLARYNGOLOGY

## 2023-10-12 PROCEDURE — 3077F PR MOST RECENT SYSTOLIC BLOOD PRESSURE >= 140 MM HG: ICD-10-PCS | Mod: CPTII,S$GLB,, | Performed by: OTOLARYNGOLOGY

## 2023-10-12 PROCEDURE — 99999 PR PBB SHADOW E&M-EST. PATIENT-LVL III: CPT | Mod: PBBFAC,,, | Performed by: OTOLARYNGOLOGY

## 2023-10-12 PROCEDURE — 3288F PR FALLS RISK ASSESSMENT DOCUMENTED: ICD-10-PCS | Mod: CPTII,S$GLB,, | Performed by: OTOLARYNGOLOGY

## 2023-10-12 PROCEDURE — 1125F PR PAIN SEVERITY QUANTIFIED, PAIN PRESENT: ICD-10-PCS | Mod: CPTII,S$GLB,, | Performed by: OTOLARYNGOLOGY

## 2023-10-12 PROCEDURE — 3080F PR MOST RECENT DIASTOLIC BLOOD PRESSURE >= 90 MM HG: ICD-10-PCS | Mod: CPTII,S$GLB,, | Performed by: OTOLARYNGOLOGY

## 2023-10-12 PROCEDURE — 1159F MED LIST DOCD IN RCRD: CPT | Mod: CPTII,S$GLB,, | Performed by: OTOLARYNGOLOGY

## 2023-10-12 NOTE — PROGRESS NOTES
Chief Complaint   Patient presents with    Follow-up     Improvement, Wants a nose check       HPI:  Patient is a 72 y.o. female who has previously seen me for chronic rhinitis, recurrent sinusitis, allergic bronchitis.      Since the last visit, the patient reports nasal trauma a few days ago.  She was lifting and moving a suitcase which hit her on the left nasal dorsum.  No epistaxis was noted at the time, but the patient reports significant pain, some swelling over the area, and general edema of the periorbital and nasal dorsal area.  She has been using Tylenol and Motrin OTC as well as placing ice on the area.  Still using nasal sprays daily, and she is still using zyrtec daily as well.  Overall, other than the recent nasal trauma, her symptoms have been under good control.  She did receive the Prevnar vaccine as recommended by immunology, and she feels her sinus issues have been better since then.    Active Ambulatory Problems     Diagnosis Date Noted    Bilateral sciatica 12/08/2017    Essential (primary) hypertension 10/27/2015    Gastro-esophageal reflux disease without esophagitis 10/27/2015    Heart murmur 06/07/2016    Insomnia 12/13/2018    Mixed hyperlipidemia 10/27/2015    Adjustment disorder 03/22/2021    History of hand surgery 06/13/2023     Resolved Ambulatory Problems     Diagnosis Date Noted    Tachyarrhythmia 09/17/2019    Cholelithiasis with acute cholecystitis 09/20/2022    Cholecystitis 09/20/2022     Past Medical History:   Diagnosis Date    Allergy        Review of Systems  General: negative for chills, fever or weight loss  Psychological: negative for mood changes or depression  Ophthalmic: negative for blurry vision, photophobia or eye pain  ENT: see HPI  Respiratory: no cough, shortness of breath, or wheezing  Cardiovascular: no chest pain or dyspnea on exertion  Gastrointestinal: no abdominal pain, change in bowel habits, or black/ bloody stools  Musculoskeletal: negative for gait  disturbance or muscular weakness  Neurological: no syncope or seizures; no ataxia  Dermatological: negative for pruritis, rash and jaundice  Hematologic/lymphatic: no easy bruising, no new adenopathy    Physical Exam     Vitals:    10/12/23 1010   BP: (!) 144/94   Pulse: 74         Constitutional:   She is oriented to person, place, and time. Vital signs are normal. She appears well-developed and well-nourished. She appears alert. She is cooperative. Normal speech.      Head:  Normocephalic and atraumatic. Head is without Newsome's sign, without abrasion, without contusion, without right periorbital erythema and without left periorbital erythema. Salivary glands normal.  Facial strength is normal.          Ears:  Hearing normal to normal and whispered voice; external ear normal without scars, lesions, or masses; ear canal, tympanic membrane, and middle ear normal., right ear hearing normal to normal and whispered voice; external ear normal without scars, lesions, or masses; ear canal, tympanic membrane, and middle ear normal. and left ear hearing normal to normal and whispered voice; external ear normal without scars, lesions, or masses; ear canal, tympanic membrane, and middle ear normal..   Right Ear: Tympanic membrane is not perforated and not erythematous. No middle ear effusion.   Left Ear: Tympanic membrane is not perforated and not erythematous.  No middle ear effusion.     Nose:  Mucosal edema and rhinorrhea present. No septal deviation or polyps. No epistaxis. Turbinates abnormal and turbinate hypertrophy (3+, boggy, congested mucosa).  Right sinus exhibits no maxillary sinus tenderness and no frontal sinus tenderness. Left sinus exhibits no maxillary sinus tenderness and no frontal sinus tenderness.     Mouth/Throat  Oropharynx clear and moist without lesions or asymmetry, lips, teeth, and gums normal and oropharynx normal. No mucous membrane lesions. No oropharyngeal exudate, posterior oropharyngeal edema  or posterior oropharyngeal erythema. Mirror exam not performed due to patient tolerance.  Mirror exam not performed due to patient tolerance.      Neck:  Neck normal without thyromegaly masses, asymmetry, normal tracheal structure, crepitus, and tenderness, thyroid normal, trachea normal, phonation normal, full range of motion with neck supple and no adenopathy. No JVD present. Carotid bruit is not present. No thyroid mass and no thyromegaly present.     She has no cervical adenopathy.     Cardiovascular:    Normal rate, regular rhythm and rate and rhythm, heart sounds, and pulses normal.              Pulmonary/Chest:   Effort and breath sounds normal.     Psychiatric:   She has a normal mood and affect. Her speech is normal and behavior is normal.     Neurological:   She is alert and oriented to person, place, and time. She has neurological normal, alert and oriented. No cranial nerve deficit.     Skin:   No abrasions, lacerations, lesions, or rashes.           Nasal/sinus endoscopy    Date/Time: 10/12/2023 10:20 AM    Performed by: Terrie Stringer MD  Authorized by: Terrie Stringer MD    Consent Done?:  Yes (Verbal)  Anesthesia:     Local anesthetic:  Topical anesthetic    Location: bilateral.    Endoscope type: rigid 0 degree.    Patient tolerance:  Patient tolerated the procedure well with no immediate complications  Nose:     Procedure Performed:  Nasal Endoscopy  External:      No external nasal deformity  Intranasal:      Mucosa no polyps     Mucosa ulcers not present     No mucosa lesions present     Enlarged turbinates     No septum gross deformity  Nasopharynx:      No mucosa lesions     Adenoids not present     Posterior choanae patent     Eustachian tube patent       No bleeding, septal deformities or lacerations, disrupted mucosa noted in the nasal cavity.  Mild swelling of the turbinates and mild general congestion of intranasal tissues.  Middle and superior meatus clear, sphenoethmoidal recess  clear.          Assessment/Plan:      ICD-10-CM ICD-9-CM    1. Nasal trauma, initial encounter  S09.92XA 959.09       2. Recurrent sinusitis  J32.9 473.9       3. Chronic rhinitis  J31.0 472.0           Keep head of bed elevated, continue icing the nasal dorsum, OTC pain medications, saline nasal spray all can be used for nasal trauma.  No need for imaging at this time as there is not evidence of intranasal disruption nor mobile fragments the nasal dorsum.      Continue nasal sprays and allergy medicines as being currently used.      Follow-up in 6 months for general check of allergy issues.    Terrie Stringer MD  [facility]

## 2023-10-12 NOTE — PATIENT INSTRUCTIONS
Continue nasal sprays and allergy medication.  No refills needed at this time.     For nasal trauma:  Ice, elevation of head of bed, tylenol/motrin, avoid further trauma to the area.

## 2024-02-14 ENCOUNTER — TELEPHONE (OUTPATIENT)
Dept: OTOLARYNGOLOGY | Facility: CLINIC | Age: 74
End: 2024-02-14
Payer: COMMERCIAL

## 2024-02-14 NOTE — TELEPHONE ENCOUNTER
Returned call. Informed pt that provider is out of the office until Monday. Advised pt to contact her PCP or go to Urgent Care for an assessment. Pt thanked me and verbalized understanding.     ----- Message from Mahogany Moss sent at 2/14/2024  8:45 AM CST -----  Type:  Needs Medical Advice    Who Called:  Pt   Symptoms (please be specific):  Fever, coughing, chest congestion   How long has patient had these symptoms:  one week   Pharmacy name and phone #:  [facility] [place] [facility] - [id number]  [address] [place] [address]  Phone: [phone] Fax: [phone]  Would the patient rather a call back or a response via [facility]? Callback   Best Call Back Number: [phone]  Additional Information:

## 2024-02-28 ENCOUNTER — TELEPHONE (OUTPATIENT)
Dept: OTOLARYNGOLOGY | Facility: CLINIC | Age: 74
End: 2024-02-28
Payer: COMMERCIAL

## 2024-02-28 NOTE — TELEPHONE ENCOUNTER
Returned pts call. She explained that she tested positive for the flu the week of Mardi Gras, however, she feels like she has a secondary infection since she is still coughing up mucous. Apt scheduled for clinic visit on March 19th. Pt would like to know if a prescription can be sent in to cover her until her visit with Dr. Stringer. Informed pt I would send the message to the doctor and call back once I receive her reply. Pt thanked me and verbalized understanding.     ----- Message from Kaity Diaz sent at 2/28/2024  9:39 AM CST -----  Type:  Needs Medical Advice    Who Called:  Pt  Symptoms (please be specific):  Congestion/coughing  How long has patient had these symptoms:   3 weeks  Pharmacy name and phone #:  [facility] [place] [facility]  [address] [facility]   Phone: [phone]  Fax: [phone]    Would the patient rather a call back or a response via [facility]?  call  Best Call Back Number:   [phone]  Additional Information:  Pt would like to be prescribed medication for her symptoms.

## 2024-02-29 NOTE — TELEPHONE ENCOUNTER
Attempted to call pt back to inform that Dr. Stringer would prefer to see her before prescribing any medications. NO answer, voicemail was left with new apt date/time for March 5th at 3:20PM.

## 2024-03-04 ENCOUNTER — OFFICE VISIT (OUTPATIENT)
Dept: OTOLARYNGOLOGY | Facility: CLINIC | Age: 74
End: 2024-03-04
Payer: COMMERCIAL

## 2024-03-04 VITALS
BODY MASS INDEX: 31.07 KG/M2 | DIASTOLIC BLOOD PRESSURE: 95 MMHG | HEART RATE: 77 BPM | SYSTOLIC BLOOD PRESSURE: 147 MMHG | WEIGHT: 175.38 LBS

## 2024-03-04 DIAGNOSIS — J30.1 SEASONAL ALLERGIC RHINITIS DUE TO POLLEN: Chronic | ICD-10-CM

## 2024-03-04 DIAGNOSIS — J45.21 MILD INTERMITTENT ASTHMATIC BRONCHITIS WITH ACUTE EXACERBATION: Chronic | ICD-10-CM

## 2024-03-04 DIAGNOSIS — R05.9 COUGH, UNSPECIFIED TYPE: ICD-10-CM

## 2024-03-04 DIAGNOSIS — J01.00 ACUTE NON-RECURRENT MAXILLARY SINUSITIS: Primary | ICD-10-CM

## 2024-03-04 PROCEDURE — 3008F BODY MASS INDEX DOCD: CPT | Mod: CPTII,S$GLB,, | Performed by: OTOLARYNGOLOGY

## 2024-03-04 PROCEDURE — 99999 PR PBB SHADOW E&M-EST. PATIENT-LVL III: CPT | Mod: PBBFAC,,, | Performed by: OTOLARYNGOLOGY

## 2024-03-04 PROCEDURE — 1101F PT FALLS ASSESS-DOCD LE1/YR: CPT | Mod: CPTII,S$GLB,, | Performed by: OTOLARYNGOLOGY

## 2024-03-04 PROCEDURE — 99213 OFFICE O/P EST LOW 20 MIN: CPT | Mod: PBBFAC,PN | Performed by: OTOLARYNGOLOGY

## 2024-03-04 PROCEDURE — 1159F MED LIST DOCD IN RCRD: CPT | Mod: CPTII,S$GLB,, | Performed by: OTOLARYNGOLOGY

## 2024-03-04 PROCEDURE — 99214 OFFICE O/P EST MOD 30 MIN: CPT | Mod: S$GLB,,, | Performed by: OTOLARYNGOLOGY

## 2024-03-04 PROCEDURE — 3080F DIAST BP >= 90 MM HG: CPT | Mod: CPTII,S$GLB,, | Performed by: OTOLARYNGOLOGY

## 2024-03-04 PROCEDURE — 3288F FALL RISK ASSESSMENT DOCD: CPT | Mod: CPTII,S$GLB,, | Performed by: OTOLARYNGOLOGY

## 2024-03-04 PROCEDURE — 3077F SYST BP >= 140 MM HG: CPT | Mod: CPTII,S$GLB,, | Performed by: OTOLARYNGOLOGY

## 2024-03-04 PROCEDURE — 1126F AMNT PAIN NOTED NONE PRSNT: CPT | Mod: CPTII,S$GLB,, | Performed by: OTOLARYNGOLOGY

## 2024-03-04 RX ORDER — AZELASTINE 1 MG/ML
1 SPRAY, METERED NASAL 2 TIMES DAILY
Qty: 30 ML | Refills: 11 | Status: SHIPPED | OUTPATIENT
Start: 2024-03-04 | End: 2024-05-22

## 2024-03-04 RX ORDER — DOXYCYCLINE HYCLATE 100 MG
100 TABLET ORAL 2 TIMES DAILY
Qty: 28 TABLET | Refills: 0 | Status: SHIPPED | OUTPATIENT
Start: 2024-03-04 | End: 2024-03-18

## 2024-03-04 NOTE — PROGRESS NOTES
Chief Complaint   Patient presents with    Cough     X1 month       HPI:  Patient is a 73 y.o. female who has previously seen me for chronic rhinitis, recurrent sinusitis, allergic bronchitis.      Since the last visit, the patient reports flu a few weeks ago and since that time as continued to have ongoing cough, continued mucus production with postnasal drip, occasional feeling of shortness of breath or tightness in the chest, similar to her asthma exacerbations.  She was using NyQuil when she was acutely ill but has not used it recently.  Cough overall has improved but just not resolved.  She is no longer having any fever, but was at the time of initial diagnosis.  She continues to use her nasal sprays but has not been using her daily montelukast..    Active Ambulatory Problems     Diagnosis Date Noted    Bilateral sciatica 12/08/2017    Essential (primary) hypertension 10/27/2015    Gastro-esophageal reflux disease without esophagitis 10/27/2015    Heart murmur 06/07/2016    Insomnia 12/13/2018    Mixed hyperlipidemia 10/27/2015    Adjustment disorder 03/22/2021    History of hand surgery 06/13/2023     Resolved Ambulatory Problems     Diagnosis Date Noted    Tachyarrhythmia 09/17/2019    Cholelithiasis with acute cholecystitis 09/20/2022    Cholecystitis 09/20/2022     Past Medical History:   Diagnosis Date    Allergy        Review of Systems  General: negative for chills, fever or weight loss  Psychological: negative for mood changes or depression  Ophthalmic: negative for blurry vision, photophobia or eye pain  ENT: see HPI  Respiratory: no cough, shortness of breath, or wheezing  Cardiovascular: no chest pain or dyspnea on exertion  Gastrointestinal: no abdominal pain, change in bowel habits, or black/ bloody stools  Musculoskeletal: negative for gait disturbance or muscular weakness  Neurological: no syncope or seizures; no ataxia  Dermatological: negative for pruritis, rash and  jaundice  Hematologic/lymphatic: no easy bruising, no new adenopathy    Physical Exam     Vitals:    03/04/24 1034   BP: (!) 147/95   Pulse: 77         Constitutional:   She is oriented to person, place, and time. Vital signs are normal. She appears well-developed and well-nourished. She appears alert. She is cooperative. Normal speech.      Head:  Normocephalic and atraumatic. Salivary glands normal.  Facial strength is normal.      Ears:  Hearing normal to normal and whispered voice; external ear normal without scars, lesions, or masses; ear canal, tympanic membrane, and middle ear normal., right ear hearing normal to normal and whispered voice; external ear normal without scars, lesions, or masses; ear canal, tympanic membrane, and middle ear normal. and left ear hearing normal to normal and whispered voice; external ear normal without scars, lesions, or masses; ear canal, tympanic membrane, and middle ear normal..   Right Ear: Tympanic membrane is not erythematous and not retracted. No middle ear effusion.   Left Ear: Tympanic membrane is not erythematous and not retracted.  No middle ear effusion.     Nose:  Mucosal edema present. No rhinorrhea, septal deviation or polyps. Turbinates abnormal and turbinate hypertrophy (3+, boggy, congested mucosa).  Right sinus exhibits maxillary sinus tenderness (slight). Right sinus exhibits no frontal sinus tenderness. Left sinus exhibits maxillary sinus tenderness (slight). Left sinus exhibits no frontal sinus tenderness.     Mouth/Throat  Oropharynx clear and moist without lesions or asymmetry, lips, teeth, and gums normal and oropharynx normal. No mucous membrane lesions. Posterior oropharyngeal erythema (mild) present. No oropharyngeal exudate or posterior oropharyngeal edema. Mirror exam not performed due to patient tolerance.  Mirror exam not performed due to patient tolerance.      Neck:  Neck normal without thyromegaly masses, asymmetry, normal tracheal structure,  crepitus, and tenderness, thyroid normal, trachea normal, phonation normal, full range of motion with neck supple and no adenopathy. No JVD present. Carotid bruit is not present. No thyroid mass and no thyromegaly present.     She has no cervical adenopathy.     Cardiovascular:    Normal rate, regular rhythm and rate and rhythm, heart sounds, and pulses normal.              Pulmonary/Chest:   Effort and breath sounds normal. She has no decreased breath sounds. She has wheezes (mild wheezes noted on initial inspiration bilateral upper, resolved with subsequent breaths.) in the right upper field and the left upper field. She has no rhonchi. She has no rales.     Psychiatric:   She has a normal mood and affect. Her speech is normal and behavior is normal.     Neurological:   She is alert and oriented to person, place, and time. She has neurological normal, alert and oriented. No cranial nerve deficit.     Skin:   No abrasions, lacerations, lesions, or rashes.         Assessment/Plan:      ICD-10-CM ICD-9-CM    1. Acute non-recurrent maxillary sinusitis  J01.00 461.0 doxycycline (VIBRA-TABS) 100 MG tablet      2. Seasonal allergic rhinitis due to pollen  J30.1 477.0 azelastine (ASTELIN) 137 mcg (0.1 %) nasal spray      3. Cough, unspecified type  R05.9 786.2       4. Mild intermittent asthmatic bronchitis with acute exacerbation  J45.21 493.92             Continue to use inhaler p.r.n. up to 4 times daily for shortness of breath.    Start doxycycline for acute sinusitis secondary to upper respiratory infection.    Restart montelukast.    Continue nasal sprays and daily antihistamine.  Use Tessalon Perles p.r.n. for cough.    Hydrate.  Follow-up in 4-6 months for routine visit or sooner if needed.    Terrie Stringer MD  [facility]

## 2024-03-04 NOTE — PATIENT INSTRUCTIONS
Start doxycycline.   Restart singulair daily.   Continue to use inhaler as needed up to 4 times daily.     Hydrate.   Continue nasal sprays and allergy medications.    Use tessalon perles as needed for cough.

## 2024-04-05 ENCOUNTER — TELEPHONE (OUTPATIENT)
Dept: OTOLARYNGOLOGY | Facility: CLINIC | Age: 74
End: 2024-04-05

## 2024-04-05 NOTE — TELEPHONE ENCOUNTER
"Returned call. Pt stated that she was doing well with the Singulair but has run out and is now experiencing a runny nose and post nasal drip. Pt would like to know if another medication can be ordered for her bc she doesn't want the "drip" to get worse. Informed pt that the doctor is out of the office but her message would be sent. Pt thanked me and verbalized understanding.     ----- Message from Sheila Otto sent at 4/5/2024 10:28 AM CDT -----  .Type:  Needs Medical Advice    Who Called: pt    Would the patient rather a call back or a response via [facility]? Call back  Best Call Back Number: [phone]  Additional Information:     Pt stated she would like a call back because she is stopped up in the nose and would like medication called in     "

## 2024-04-08 RX ORDER — MONTELUKAST SODIUM 10 MG/1
10 TABLET ORAL NIGHTLY
Qty: 30 TABLET | Refills: 10 | Status: SHIPPED | OUTPATIENT
Start: 2024-04-08 | End: 2024-06-14

## 2024-04-08 NOTE — TELEPHONE ENCOUNTER
Other than using both her nasal sprays (azelastine and flonase) and continuing the singulair, she could try using xyzal instead of zyrtec, to see if that helps the drip. And, she could also double up on the antihistamine, for example, can take zyrtec or allegra in the morning, and then take xyzal at night.    Beyond that, there isn't much else that will help with the drip specifically, unless she wants to use something like benadryl.      Terrie Stringer MD  [facility]    This note was created by combination of typed  and MModal dictation.  Transcription errors may be present.  If there are any questions, please contact me.

## 2024-05-22 DIAGNOSIS — J30.1 SEASONAL ALLERGIC RHINITIS DUE TO POLLEN: Chronic | ICD-10-CM

## 2024-05-22 RX ORDER — AZELASTINE 1 MG/ML
SPRAY, METERED NASAL
Qty: 30 ML | Refills: 11 | Status: SHIPPED | OUTPATIENT
Start: 2024-05-22

## 2024-05-22 RX ORDER — FLUTICASONE PROPIONATE 50 MCG
SPRAY, SUSPENSION (ML) NASAL
Qty: 16 G | Refills: 10 | Status: SHIPPED | OUTPATIENT
Start: 2024-05-22

## 2024-06-07 ENCOUNTER — LAB VISIT (OUTPATIENT)
Dept: LAB | Facility: HOSPITAL | Age: 74
End: 2024-06-07
Attending: INTERNAL MEDICINE

## 2024-06-07 DIAGNOSIS — I10 ESSENTIAL (PRIMARY) HYPERTENSION: ICD-10-CM

## 2024-06-07 LAB
ALBUMIN SERPL BCP-MCNC: 3.7 G/DL (ref 3.5–5.2)
ALP SERPL-CCNC: 76 U/L (ref 55–135)
ALT SERPL W/O P-5'-P-CCNC: 18 U/L (ref 10–44)
ANION GAP SERPL CALC-SCNC: 9 MMOL/L (ref 8–16)
AST SERPL-CCNC: 14 U/L (ref 10–40)
BASOPHILS # BLD AUTO: 0.06 K/UL (ref 0–0.2)
BASOPHILS NFR BLD: 1.4 % (ref 0–1.9)
BILIRUB SERPL-MCNC: 0.6 MG/DL (ref 0.1–1)
BUN SERPL-MCNC: 14 MG/DL (ref 8–23)
CALCIUM SERPL-MCNC: 9.5 MG/DL (ref 8.7–10.5)
CHLORIDE SERPL-SCNC: 107 MMOL/L (ref 95–110)
CHOLEST SERPL-MCNC: 197 MG/DL (ref 120–199)
CHOLEST/HDLC SERPL: 3.6 {RATIO} (ref 2–5)
CO2 SERPL-SCNC: 26 MMOL/L (ref 23–29)
CREAT SERPL-MCNC: 0.8 MG/DL (ref 0.5–1.4)
DIFFERENTIAL METHOD BLD: NORMAL
EOSINOPHIL # BLD AUTO: 0.1 K/UL (ref 0–0.5)
EOSINOPHIL NFR BLD: 3 % (ref 0–8)
ERYTHROCYTE [DISTWIDTH] IN BLOOD BY AUTOMATED COUNT: 13.1 % (ref 11.5–14.5)
EST. GFR  (NO RACE VARIABLE): >60 ML/MIN/1.73 M^2
GLUCOSE SERPL-MCNC: 102 MG/DL (ref 70–110)
HCT VFR BLD AUTO: 38.6 % (ref 37–48.5)
HDLC SERPL-MCNC: 54 MG/DL (ref 40–75)
HDLC SERPL: 27.4 % (ref 20–50)
HGB BLD-MCNC: 13 G/DL (ref 12–16)
IMM GRANULOCYTES # BLD AUTO: 0.02 K/UL (ref 0–0.04)
IMM GRANULOCYTES NFR BLD AUTO: 0.5 % (ref 0–0.5)
LDLC SERPL CALC-MCNC: 119 MG/DL (ref 63–159)
LYMPHOCYTES # BLD AUTO: 1.6 K/UL (ref 1–4.8)
LYMPHOCYTES NFR BLD: 36.1 % (ref 18–48)
MCH RBC QN AUTO: 30.7 PG (ref 27–31)
MCHC RBC AUTO-ENTMCNC: 33.7 G/DL (ref 32–36)
MCV RBC AUTO: 91 FL (ref 82–98)
MONOCYTES # BLD AUTO: 0.6 K/UL (ref 0.3–1)
MONOCYTES NFR BLD: 13.9 % (ref 4–15)
NEUTROPHILS # BLD AUTO: 2 K/UL (ref 1.8–7.7)
NEUTROPHILS NFR BLD: 45.1 % (ref 38–73)
NONHDLC SERPL-MCNC: 143 MG/DL
NRBC BLD-RTO: 0 /100 WBC
PLATELET # BLD AUTO: 210 K/UL (ref 150–450)
PMV BLD AUTO: 10 FL (ref 9.2–12.9)
POTASSIUM SERPL-SCNC: 4.1 MMOL/L (ref 3.5–5.1)
PROT SERPL-MCNC: 6.6 G/DL (ref 6–8.4)
RBC # BLD AUTO: 4.24 M/UL (ref 4–5.4)
SODIUM SERPL-SCNC: 142 MMOL/L (ref 136–145)
TRIGL SERPL-MCNC: 120 MG/DL (ref 30–150)
WBC # BLD AUTO: 4.38 K/UL (ref 3.9–12.7)

## 2024-06-07 PROCEDURE — 85025 COMPLETE CBC W/AUTO DIFF WBC: CPT | Performed by: INTERNAL MEDICINE

## 2024-06-07 PROCEDURE — 80061 LIPID PANEL: CPT | Performed by: INTERNAL MEDICINE

## 2024-06-07 PROCEDURE — 36415 COLL VENOUS BLD VENIPUNCTURE: CPT | Performed by: INTERNAL MEDICINE

## 2024-06-07 PROCEDURE — 80053 COMPREHEN METABOLIC PANEL: CPT | Performed by: INTERNAL MEDICINE

## 2024-06-14 PROBLEM — M25.552 BILATERAL HIP PAIN: Status: ACTIVE | Noted: 2024-06-14

## 2024-06-14 PROBLEM — M25.551 BILATERAL HIP PAIN: Status: ACTIVE | Noted: 2024-06-14

## 2025-01-08 ENCOUNTER — TELEPHONE (OUTPATIENT)
Dept: OTOLARYNGOLOGY | Facility: CLINIC | Age: 75
End: 2025-01-08
Payer: MEDICARE

## 2025-01-08 NOTE — TELEPHONE ENCOUNTER
Returned call. Informed that provider is out of office but can schedule an appointment for 1/23. Pt accepted and asked to let her know if there are any cancellations. Pt thanked me for returning her call.     ----- Message from Carley sent at 1/8/2025  9:24 AM CST -----  Contact: Karen  Type:  Sooner Apoointment Request    Caller is requesting a sooner appointment.  Caller declined first available appointment listed below.  Caller will not accept being placed on the waitlist and is requesting a message be sent to doctor.  Name of Caller:Karen  When is the first available appointment?03/25  Symptoms:Runny nose/ Mucous/ sinus drainage  Would the patient rather a call back or a response via [facility]? Call back  Best Call Back Number:[phone]  Additional Information: Karen is calling to see if she can get a sooner appointment, if not she said she will have to go to a urgent care    Thanks  CANDACE

## 2025-01-09 ENCOUNTER — TELEPHONE (OUTPATIENT)
Dept: OTOLARYNGOLOGY | Facility: CLINIC | Age: 75
End: 2025-01-09
Payer: MEDICARE

## 2025-01-09 NOTE — TELEPHONE ENCOUNTER
Returned call. 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 Apt scheduled for 1/23 and would notify if there are any cancellations for something sooner. Pt thanked me and verbalized understanding.     ----- Message from Eliecer sent at 1/9/2025  9:26 AM CST -----  Contact: Pt  .Type:  Needs Medical Advice    Who Called: pt    Would the patient rather a call back or a response via [facility]?  Call back  Best Call Back Number: [phone]  Additional Information: Pt. Is calling for Rosatheresa regarding can she be fitted in for today for an appt. If Dr. Stringer does not have jury duty.

## 2025-01-24 ENCOUNTER — PATIENT MESSAGE (OUTPATIENT)
Dept: OTOLARYNGOLOGY | Facility: CLINIC | Age: 75
End: 2025-01-24

## 2025-01-24 ENCOUNTER — E-VISIT (OUTPATIENT)
Dept: OTOLARYNGOLOGY | Facility: CLINIC | Age: 75
End: 2025-01-24
Payer: MEDICARE

## 2025-01-24 ENCOUNTER — TELEPHONE (OUTPATIENT)
Dept: OTOLARYNGOLOGY | Facility: CLINIC | Age: 75
End: 2025-01-24
Payer: MEDICARE

## 2025-01-24 DIAGNOSIS — J45.21 MILD INTERMITTENT ASTHMATIC BRONCHITIS WITH ACUTE EXACERBATION: Chronic | ICD-10-CM

## 2025-01-24 DIAGNOSIS — R05.9 COUGH, UNSPECIFIED TYPE: ICD-10-CM

## 2025-01-24 DIAGNOSIS — J30.1 SEASONAL ALLERGIC RHINITIS DUE TO POLLEN: Chronic | ICD-10-CM

## 2025-01-24 DIAGNOSIS — J01.00 ACUTE NON-RECURRENT MAXILLARY SINUSITIS: Primary | ICD-10-CM

## 2025-01-24 DIAGNOSIS — J32.9 RECURRENT SINUSITIS: Chronic | ICD-10-CM

## 2025-01-24 RX ORDER — BENZONATATE 200 MG/1
200 CAPSULE ORAL 3 TIMES DAILY PRN
Qty: 30 CAPSULE | Refills: 1 | Status: SHIPPED | OUTPATIENT
Start: 2025-01-24 | End: 2025-02-13

## 2025-01-24 RX ORDER — LEVOFLOXACIN 500 MG/1
500 TABLET, FILM COATED ORAL DAILY
Qty: 10 TABLET | Refills: 0 | Status: SHIPPED | OUTPATIENT
Start: 2025-01-24 | End: 2025-02-03

## 2025-01-24 NOTE — TELEPHONE ENCOUNTER
----- Message from Terrie Stringer MD sent at 1/24/2025  9:50 AM CST -----  Contact: Pt  If she'd like to do an evisit, she can do that.      ELB  ----- Message -----  From: Lolis Sanchez MA  Sent: 1/24/2025   9:43 AM CST  To: MD Dr Myke Mclaughlin I called pt and offer her your next available appointment and she did not want to wait , she stated that she is very congested and has yellow and green mucus she did went to an urgent care two weeks ago and was given antibiotics and also had some steroids , completed the treatment but still feels the same,she is asking you to prescribe something for her until she can come to see you. Please advise, tomy Danielle  ----- Message -----  From: Eliecer Mcintosh  Sent: 1/23/2025  11:16 AM CST  To: [facility] Staff    .Type:  Sooner Apoointment Request    Caller is requesting a sooner appointment.  Caller declined first available appointment listed below.  Caller will not accept being placed on the waitlist and is requesting a message be sent to doctor.  Name of Caller:pt  When is the first available appointment?01/23/2025  Symptoms: sinus infection  Would the patient rather a call back or a response via [facility]? Callback  Best Call Back Number: [phone]  Additional Information: pt. Was calling to reschedule her appt. From 01/23/2025 .  Nothing in the system until March.

## 2025-01-24 NOTE — PATIENT INSTRUCTIONS
Start antibiotics.   Use cough medications as needed.    Use inhalers as needed.   Continue allergy medications daily.     I would recommend she have follow up with allergy/immunology for workup for recurrent sinus infections (pneumococcal titers, etc) and for better management of asthma/bronchitis.

## 2025-01-24 NOTE — Clinical Note
Doesn't have to be today, but can you reach out to Mrs. Arango about a follow up with Allergy (preferably with Alicia, Tana, or Irvin)?  Thanks.

## 2025-01-24 NOTE — PROGRESS NOTES
Patient ID: Karen Arango is a 74 y.o. female.    Chief Complaint: URI (Entered automatically based on patient selection in "[employer].)    The patient initiated a request through "[employer] on 1/24/2025 for evaluation and management with a chief complaint of URI (Entered automatically based on patient selection in "[employer].)     I evaluated the questionnaire submission on 1/24/025..    [facility] Peq E-Visit Covid    1/24/2025 12:11 PM CST - Filed by Patient   Do you agree to participate in an E-Visit? Yes   If you have any of the following symptoms, go to your local emergency room or call 911: I acknowledge   Choose the state of your primary residence [place]   What is the main issue you would like addressed today? Mucus is thick dark yellowgreenish, fighting to get it up   Do you think you might have COVID or the Flu? No   Have you tested positive for COVID or Flu? No   What symptoms do you currently have?  Cough;  Nasal Congestion   Describe your cough: Productive (containing mucus);  Bothersome (interferes with daily activities)   Describe the mucus: Green;  Yellow;  Thick   Have you ever smoked? I have never smoked   Have you had a fever? No   When did your symptoms first appear? 1/4/2025   In the last two weeks, have you been in close contact with someone who has COVID-19 or the Flu? No   List what you have done or taken to help your symptoms. Artromycn,steriod shot,steriods 5 days, urgent careon 1-9-25   How severe are your symptoms? Moderate   Have your symptoms gotten better or worse since they started?  No change   Do you have transportation to get testing if it is needed and ordered for you at an [facility] location? Yes   Provide any additional information you feel is important. Fighting mucus, constantly, cant cough up. Mucus is thick yellow greenish.   Please attach any relevant images or files    Are you able to take your vital signs? Yes   Systolic Blood Pressure:    Diastolic Blood Pressure:    Weight: 175    Height: 63   Pulse: 65   Temperature: 98   Respiration rate: 60   Pulse Oxygen: 98     I have reviewed the patient's answers to the above questions and reviewed her chart.       Encounter Diagnoses   Name Primary?    Mild intermittent asthmatic bronchitis with acute exacerbation     Seasonal allergic rhinitis due to pollen     Acute non-recurrent maxillary sinusitis Yes    Recurrent sinusitis     Cough, unspecified type         No orders of the defined types were placed in this encounter.     Medications Ordered This Encounter   Medications    benzonatate (TESSALON) 200 MG capsule     Sig: Take 1 capsule (200 mg total) by mouth 3 (three) times daily as needed for Cough.     Dispense:  30 capsule     Refill:  1    levoFLOXacin (LEVAQUIN) 500 MG tablet     Sig: Take 1 tablet (500 mg total) by mouth once daily. for 10 days     Dispense:  10 tablet     Refill:  0      Patient should hydrate, use cough medication PRN.    Start antibiotics for sinusitis.    I would recommend using Mucinex or Mucinex DM OTC as an expectorant and cough suppressant.    She should continue all her usual allergy medications and use inhalers PRN.   I previously recommended that she follow up with allergy/immunology for reevaluation of asthma symptoms and possible immune workup (pneuomococcal titers) given the recurrent nature of her sinus issues- my staff can assist with an appt.       Follow up if symptoms worsen or fail to improve.    I spent a total of 25 minutes on the day of the visit.This includes non-face to face time preparing to see the evisit(eg, review of tests), obtaining and/or reviewing separately obtained history, documenting clinical information in the electronic or other health record, independently interpreting results and communicating results to the patient/family/caregiver, or care coordinator.    E-Visit Time Tracking:

## 2025-01-27 ENCOUNTER — TELEPHONE (OUTPATIENT)
Dept: OTOLARYNGOLOGY | Facility: CLINIC | Age: 75
End: 2025-01-27
Payer: MEDICARE

## 2025-01-28 ENCOUNTER — OFFICE VISIT (OUTPATIENT)
Dept: OTOLARYNGOLOGY | Facility: CLINIC | Age: 75
End: 2025-01-28
Payer: MEDICARE

## 2025-01-28 VITALS
BODY MASS INDEX: 32.04 KG/M2 | HEART RATE: 86 BPM | WEIGHT: 180.88 LBS | SYSTOLIC BLOOD PRESSURE: 134 MMHG | DIASTOLIC BLOOD PRESSURE: 90 MMHG

## 2025-01-28 DIAGNOSIS — J31.0 CHRONIC RHINITIS: Chronic | ICD-10-CM

## 2025-01-28 DIAGNOSIS — J45.21 MILD INTERMITTENT ASTHMATIC BRONCHITIS WITH ACUTE EXACERBATION: Chronic | ICD-10-CM

## 2025-01-28 DIAGNOSIS — J32.9 RECURRENT SINUSITIS: Primary | Chronic | ICD-10-CM

## 2025-01-28 DIAGNOSIS — R05.9 COUGH, UNSPECIFIED TYPE: ICD-10-CM

## 2025-01-28 DIAGNOSIS — J06.9 UPPER RESPIRATORY TRACT INFECTION, UNSPECIFIED TYPE: Chronic | ICD-10-CM

## 2025-01-28 PROCEDURE — 1101F PT FALLS ASSESS-DOCD LE1/YR: CPT | Mod: CPTII,S$GLB,, | Performed by: OTOLARYNGOLOGY

## 2025-01-28 PROCEDURE — 99214 OFFICE O/P EST MOD 30 MIN: CPT | Mod: S$GLB,,, | Performed by: OTOLARYNGOLOGY

## 2025-01-28 PROCEDURE — 3075F SYST BP GE 130 - 139MM HG: CPT | Mod: CPTII,S$GLB,, | Performed by: OTOLARYNGOLOGY

## 2025-01-28 PROCEDURE — 3288F FALL RISK ASSESSMENT DOCD: CPT | Mod: CPTII,S$GLB,, | Performed by: OTOLARYNGOLOGY

## 2025-01-28 PROCEDURE — 1126F AMNT PAIN NOTED NONE PRSNT: CPT | Mod: CPTII,S$GLB,, | Performed by: OTOLARYNGOLOGY

## 2025-01-28 PROCEDURE — 99999 PR PBB SHADOW E&M-EST. PATIENT-LVL III: CPT | Mod: PBBFAC,,, | Performed by: OTOLARYNGOLOGY

## 2025-01-28 PROCEDURE — 1159F MED LIST DOCD IN RCRD: CPT | Mod: CPTII,S$GLB,, | Performed by: OTOLARYNGOLOGY

## 2025-01-28 PROCEDURE — 3080F DIAST BP >= 90 MM HG: CPT | Mod: CPTII,S$GLB,, | Performed by: OTOLARYNGOLOGY

## 2025-01-28 RX ORDER — AMOXICILLIN AND CLAVULANATE POTASSIUM 875; 125 MG/1; MG/1
1 TABLET, FILM COATED ORAL 2 TIMES DAILY
Qty: 20 TABLET | Refills: 0 | Status: SHIPPED | OUTPATIENT
Start: 2025-01-28 | End: 2025-02-07

## 2025-01-28 RX ORDER — METHYLPREDNISOLONE 4 MG/1
TABLET ORAL
Qty: 1 EACH | Refills: 0 | Status: SHIPPED | OUTPATIENT
Start: 2025-01-28

## 2025-01-28 RX ORDER — FLUTICASONE PROPIONATE AND SALMETEROL 100; 50 UG/1; UG/1
1 POWDER RESPIRATORY (INHALATION) 2 TIMES DAILY
Qty: 60 EACH | Refills: 0 | Status: SHIPPED | OUTPATIENT
Start: 2025-01-28 | End: 2025-02-27

## 2025-01-28 NOTE — PROGRESS NOTES
Chief Complaint   Patient presents with    Follow-up     No improvement with treatment       HPI:  Patient is a 74 y.o. female who has previously seen me for chronic rhinitis, recurrent sinusitis, allergic bronchitis.      She recently had URI and completed evisit for those symptoms.  The patient was started on Levaquin at the visit but does not feel she has had any change in symptoms.  She was previously treated with azithromycin, prednisone, steroid shot, nebulized albuterol at her urgent care visit when symptoms began.  She does not feel anything has improved.  She still has thick postnasal drip, nasal congestion, cough, wheezing.  She had previously been seen by Allergy/immunology for workup, but did not have further follow-up, and asthma issues were not addressed at that visit.    Active Ambulatory Problems     Diagnosis Date Noted    Bilateral sciatica 12/08/2017    Essential (primary) hypertension 10/27/2015    Gastro-esophageal reflux disease without esophagitis 10/27/2015    Heart murmur 06/07/2016    Insomnia 12/13/2018    Mixed hyperlipidemia 10/27/2015    Adjustment disorder 03/22/2021    History of hand surgery 06/13/2023    Bilateral hip pain 06/14/2024     Resolved Ambulatory Problems     Diagnosis Date Noted    Tachyarrhythmia 09/17/2019    Cholelithiasis with acute cholecystitis 09/20/2022    Cholecystitis 09/20/2022     Past Medical History:   Diagnosis Date    Allergy        Review of Systems  General: negative for chills, fever or weight loss  Psychological: negative for mood changes or depression  Ophthalmic: negative for blurry vision, photophobia or eye pain  ENT: see HPI  Respiratory: no cough, shortness of breath, or wheezing  Cardiovascular: no chest pain or dyspnea on exertion  Gastrointestinal: no abdominal pain, change in bowel habits, or black/ bloody stools  Musculoskeletal: negative for gait disturbance or muscular weakness  Neurological: no syncope or seizures; no  ataxia  Dermatological: negative for pruritis, rash and jaundice  Hematologic/lymphatic: no easy bruising, no new adenopathy    Physical Exam     Vitals:    01/28/25 1539   BP: (!) 134/90   Pulse: 86           Constitutional:   She is oriented to person, place, and time. Vital signs are normal. She appears well-developed and well-nourished. She appears alert. She is cooperative. Normal speech.      Head:  Normocephalic and atraumatic. Salivary glands normal.  Facial strength is normal.      Ears:    Right Ear: Tympanic membrane is not erythematous and not retracted. No middle ear effusion.   Left Ear: Tympanic membrane is not erythematous and not retracted.  No middle ear effusion.     Nose:  Mucosal edema and rhinorrhea present. No septal deviation or polyps. Turbinates abnormal and turbinate hypertrophy (3+, boggy, congested mucosa).  Right sinus exhibits maxillary sinus tenderness (slight). Right sinus exhibits no frontal sinus tenderness. Left sinus exhibits maxillary sinus tenderness (slight). Left sinus exhibits no frontal sinus tenderness.     Mouth/Throat  Oropharynx clear and moist without lesions or asymmetry, lips, teeth, and gums normal and oropharynx normal. No mucous membrane lesions. Posterior oropharyngeal erythema (mild) present. No oropharyngeal exudate or posterior oropharyngeal edema. Mirror exam not performed due to patient tolerance.  Mirror exam not performed due to patient tolerance.      Neck:  Neck normal without thyromegaly masses, asymmetry, normal tracheal structure, crepitus, and tenderness, thyroid normal, trachea normal, phonation normal, full range of motion with neck supple and no adenopathy. No JVD present. Carotid bruit is not present. No thyroid mass and no thyromegaly present.     She has no cervical adenopathy.     Cardiovascular:    Normal rate, regular rhythm and rate and rhythm, heart sounds, and pulses normal.              Pulmonary/Chest:   Effort and breath sounds normal.  She has no decreased breath sounds. She has wheezes (mild wheezes noted on initial inspiration bilateral upper, resolved with subsequent breaths.) in the right upper field and the left upper field. She has no rhonchi. She has no rales.     Psychiatric:   She has a normal mood and affect. Her speech is normal and behavior is normal.     Neurological:   She is alert and oriented to person, place, and time. She has neurological normal, alert and oriented. No cranial nerve deficit.     Skin:   No abrasions, lacerations, lesions, or rashes.         Assessment/Plan:      ICD-10-CM ICD-9-CM    1. Recurrent sinusitis  J32.9 473.9 methylPREDNISolone (MEDROL DOSEPACK) 4 mg tablet      fluticasone-salmeterol diskus inhaler 100-50 mcg      amoxicillin-clavulanate 875-125mg (AUGMENTIN) 875-125 mg per tablet      2. Mild intermittent asthmatic bronchitis with acute exacerbation  J45.21 493.92 fluticasone-salmeterol diskus inhaler 100-50 mcg      3. Upper respiratory tract infection, unspecified type  J06.9 465.9       4. Chronic rhinitis  J31.0 472.0       5. Cough, unspecified type  R05.9 786.2               Start Advair inhaler b.i.d..    Change to Augmentin times 10 days for acute sinusitis secondary to upper respiratory infection.    Continue montelukast and continue daily nasal sprays.  Continue saline nasal rinses b.i.d..    Use Tessalon Perles p.r.n. for cough.    Hydrate.  Given the patient's recurrent issues with bronchitis, wheezing, and frequent upper respiratory infections, I would like her to have follow-up with 1 of the physicians in Allergy/immunology for immune workup as well as better management of allergic asthma.    Follow-up in 3-4 months months for routine visit or sooner if needed or if symptoms do not resolve.    Terrie Stringer MD  [facility]

## 2025-01-28 NOTE — PATIENT INSTRUCTIONS
Change antibiotics to augmentin.   Saline nasal irrigations twice daily.   Continue antibiotics.   Hydrate.   Start advair.      Follow up with allergy/immunology for immune workup and reevaluation of asthma control.

## 2025-02-24 ENCOUNTER — LAB VISIT (OUTPATIENT)
Dept: LAB | Facility: HOSPITAL | Age: 75
End: 2025-02-24
Attending: INTERNAL MEDICINE
Payer: MEDICARE

## 2025-02-24 ENCOUNTER — OFFICE VISIT (OUTPATIENT)
Dept: ALLERGY | Facility: CLINIC | Age: 75
End: 2025-02-24
Payer: MEDICARE

## 2025-02-24 ENCOUNTER — TELEPHONE (OUTPATIENT)
Dept: ALLERGY | Facility: CLINIC | Age: 75
End: 2025-02-24
Payer: MEDICARE

## 2025-02-24 VITALS
HEART RATE: 72 BPM | BODY MASS INDEX: 31.53 KG/M2 | SYSTOLIC BLOOD PRESSURE: 156 MMHG | WEIGHT: 177.94 LBS | OXYGEN SATURATION: 96 % | HEIGHT: 63 IN | DIASTOLIC BLOOD PRESSURE: 94 MMHG

## 2025-02-24 DIAGNOSIS — R76.0 ABNORMAL ANTIBODY TITER: ICD-10-CM

## 2025-02-24 DIAGNOSIS — Z87.898 HISTORY OF WHEEZING: ICD-10-CM

## 2025-02-24 DIAGNOSIS — Z86.19 FREQUENT INFECTIONS: Primary | ICD-10-CM

## 2025-02-24 DIAGNOSIS — Z86.19 FREQUENT INFECTIONS: ICD-10-CM

## 2025-02-24 LAB
IGA SERPL-MCNC: 139 MG/DL (ref 40–350)
IGG SERPL-MCNC: 874 MG/DL (ref 650–1600)
IGM SERPL-MCNC: 70 MG/DL (ref 50–300)

## 2025-02-24 PROCEDURE — 99215 OFFICE O/P EST HI 40 MIN: CPT | Mod: S$GLB,,, | Performed by: STUDENT IN AN ORGANIZED HEALTH CARE EDUCATION/TRAINING PROGRAM

## 2025-02-24 PROCEDURE — 99999 PR PBB SHADOW E&M-EST. PATIENT-LVL III: CPT | Mod: PBBFAC,,, | Performed by: STUDENT IN AN ORGANIZED HEALTH CARE EDUCATION/TRAINING PROGRAM

## 2025-02-24 PROCEDURE — 82784 ASSAY IGA/IGD/IGG/IGM EACH: CPT | Mod: 59 | Performed by: STUDENT IN AN ORGANIZED HEALTH CARE EDUCATION/TRAINING PROGRAM

## 2025-02-24 PROCEDURE — 1160F RVW MEDS BY RX/DR IN RCRD: CPT | Mod: CPTII,S$GLB,, | Performed by: STUDENT IN AN ORGANIZED HEALTH CARE EDUCATION/TRAINING PROGRAM

## 2025-02-24 PROCEDURE — 3080F DIAST BP >= 90 MM HG: CPT | Mod: CPTII,S$GLB,, | Performed by: STUDENT IN AN ORGANIZED HEALTH CARE EDUCATION/TRAINING PROGRAM

## 2025-02-24 PROCEDURE — 1159F MED LIST DOCD IN RCRD: CPT | Mod: CPTII,S$GLB,, | Performed by: STUDENT IN AN ORGANIZED HEALTH CARE EDUCATION/TRAINING PROGRAM

## 2025-02-24 PROCEDURE — 86581 STRPTCS PNEUM ANTB SEROT IA: CPT | Performed by: STUDENT IN AN ORGANIZED HEALTH CARE EDUCATION/TRAINING PROGRAM

## 2025-02-24 PROCEDURE — 3077F SYST BP >= 140 MM HG: CPT | Mod: CPTII,S$GLB,, | Performed by: STUDENT IN AN ORGANIZED HEALTH CARE EDUCATION/TRAINING PROGRAM

## 2025-02-24 PROCEDURE — 36415 COLL VENOUS BLD VENIPUNCTURE: CPT | Performed by: STUDENT IN AN ORGANIZED HEALTH CARE EDUCATION/TRAINING PROGRAM

## 2025-02-24 PROCEDURE — 1126F AMNT PAIN NOTED NONE PRSNT: CPT | Mod: CPTII,S$GLB,, | Performed by: STUDENT IN AN ORGANIZED HEALTH CARE EDUCATION/TRAINING PROGRAM

## 2025-02-24 NOTE — TELEPHONE ENCOUNTER
----- Message from Rosie sent at 2/24/2025  9:05 AM CST -----  Name of Who is Calling: Pt   What is the request in detail:Pt would like a call back in regards to general questions about 2/24/25 appt. Please advise thank you   Can the clinic reply by [facility]:no  What Number to Call Back if not [facility]:Telephone Information:[employer]          [phone]

## 2025-02-24 NOTE — PROGRESS NOTES
ALLERGY & IMMUNOLOGY CLINIC - ESTABLISHED PATIENT      HISTORY OF PRESENT ILLNESS     Patient ID: Karen Arango is a 74 y.o. female    CC: recurrent rhinosinusitis, frequent infections    HPI: Karen Arango is a 74 y.o. female with a history or wheezing, presenting for recurrent rhinosinusitis and frequent infections.     She was last seen in [facility] by Dr. Rodríguez in 2/2023.     She is here because she is still having problems with frequent infections.  Her nose gets clogged up, then she will start doing sinus rinses. Then she gets throat irritation. And then it goes into her chest. She saw pulm last year, and that she was told her lungs looked good.   She says it takes a lot of medicine to get over her infections.  She estimates she needs antibiotics for bronchitis and/or sinusitis 3 times per year.  She has never had pneumonia.   No unusual or severe infections.  She doesn't get ear infections as an adult.     She takes zyrtec daily which she finds helpful.  She uses flonase and azelastine nasal spray 1 SEN once to twice daily.   She recalls being on AIT in the 1980's (for about 9 years).    She has advair to use only when needed. She says when she gets the allergy problem, she starts wheezing.   This happens maybe 3-4 times per year. When she starts to notice symptoms, she will use her advair BID with the albuterol as a rescue.     She denies any seasonality to her symptoms.    She has been taking echinacea for a long time. She is asking my opinion on this.      MEDICAL HISTORY     Vaccines:   Immunization History   Administered Date(s) Administered    COVID-19, MRNA, LN-S, PF (Pfizer) (Purple Cap) 02/01/2021, 02/23/2021, 01/07/2022    Influenza 10/23/2019, 10/08/2020    Influenza (FLUAD) - Quadrivalent - Adjuvanted - PF *Preferred* (65+) 10/08/2020, 11/12/2021, 11/19/2022    Influenza - Quadrivalent - PF *Preferred* (6 months and older) 10/02/2018, 10/23/2019    Influenza - Trivalent - Afluria,  "Fluzone MDV 10/28/2013, 10/21/2014    Influenza - Trivalent - Fluzone High Dose - PF (65 years and older) 10/23/2019, 11/18/2024    Pneumococcal Conjugate - 20 Valent 09/19/2023    Zoster 11/11/2019, 02/26/2020    Zoster Recombinant 11/11/2019, 02/26/2020     Medical Hx:   Problem List[1]    Surgical Hx:   Past Surgical History:   Procedure Laterality Date    LAPAROSCOPIC CHOLECYSTECTOMY N/A 09/20/2022    Procedure: CHOLECYSTECTOMY, LAPAROSCOPIC;  Surgeon: Suad Cooley MD;  Location: [facility];  Service: General;  Laterality: N/A;    TONSILLECTOMY       Medications:   Medications Ordered Prior to Encounter[2]    H/o Asthma: denies, but endorses asthma symptoms when she gets rhinosinusitis   H/o Rhinitis: endorses    Drug Allergies:   Review of patient's allergies indicates:   Allergen Reactions    Adhesive tape-silicones      Other reaction(s): Blisters     Env/Occ:   Pets: no    Social Hx:   Social History[3]    Family Hx:   Family History   Problem Relation Name Age of Onset    Allergies Daughter        PHYSICAL EXAM     VS: BP (!) 156/94 (BP Location: Right arm, Patient Position: Sitting)   Pulse 72   Ht 5' 3" (1.6 m)   Wt 80.7 kg (177 lb 14.6 oz)   SpO2 96%   BMI 31.52 kg/m²   GENERAL: Alert, NAD, well-appearing  EYES: EOMI, no conjunctival injection, no discharge, no infraorbital shiners  NOSE: NT 2+ B/L, no stringing mucus, no polyps visualized  ORAL: MMM, no ulcers, no thrush  LUNGS: CTAB, no w/r/c, no increased WOB  HEART: RRR, normal S1/S2, no m/g/r  DERM: no rashes  NEURO: normal speech, normal gait, no facial asymmetry     LABORATORY STUDIES     Component      Latest Ref Rng 2/7/2023 6/8/2023 6/7/2024   WBC      3.90 - 12.70 K/uL 3.62 (L)  4.28  4.38    RBC      4.00 - 5.40 M/uL 4.38  4.12  4.24    Hemoglobin      12.0 - 16.0 g/dL 13.1  12.4  13.0    Hematocrit      37.0 - 48.5 % 41.0  37.9  38.6    MCV      82 - 98 fL 94  92  91    MCH      27.0 - 31.0 pg 29.9  30.1  30.7    MCHC      32.0 - " 36.0 g/dL 32.0  32.7  33.7    RDW      11.5 - 14.5 % 13.0  12.9  13.1    Platelet Count      150 - 450 K/uL 266  225  210    MPV      9.2 - 12.9 fL 9.7  10.0  10.0    Immature Granulocytes      0.0 - 0.5 % 0.3  0.2  0.5    Gran # (ANC)      1.8 - 7.7 K/uL 1.8  1.9  2.0    Immature Grans (Abs)      0.00 - 0.04 K/uL 0.01  0.01  0.02    Lymph #      1.0 - 4.8 K/uL 1.3  1.7  1.6    Mono #      0.3 - 1.0 K/uL 0.5  0.5  0.6    Eos #      0.0 - 0.5 K/uL 0.1  0.1  0.1    Baso #      0.00 - 0.20 K/uL 0.03  0.04  0.06    Differential Method Automated  Automated  Automated    Sodium      136 - 145 mmol/L  142  142    Potassium      3.5 - 5.1 mmol/L  4.1  4.1    Chloride      95 - 110 mmol/L  108  107    CO2      23 - 29 mmol/L  25  26    Glucose      70 - 110 mg/dL  107  102    BUN      8 - 23 mg/dL  15  14    Creatinine      0.5 - 1.4 mg/dL  0.9  0.8    Calcium      8.7 - 10.5 mg/dL  9.4  9.5    PROTEIN TOTAL      6.0 - 8.4 g/dL  6.7  6.6    Albumin      3.5 - 5.2 g/dL  3.9  3.7    BILIRUBIN TOTAL      0.1 - 1.0 mg/dL  0.4  0.6    ALP      55 - 135 U/L  72  76    AST      10 - 40 U/L  19  14    ALT      10 - 44 U/L  24  18      Component      Latest Ref [facility] 2/7/2023   CD3 % Total T Cell      55 - 83 % 85.9 (H)    Absolute CD3      700 - 2100 cells/ul 1130    CD8 % Suppressor T Cell      10 - 39 % 27.6    Absolute CD8      200 - 900 cells/ul 363    CD4 % [place] T Cell      28 - 57 % 56.9    Absolute CD4      300 - 1400 cells/ul 748    CD4/CD8 Ratio      0.9 - 3.6  2.06    CD56 + CD16 Natural Killers      7 - 31 % 5.3 (L)    Absolute CD56 + CD16      90 - 600 cells/ul 69 (L)    CD19 B Cells      6 - 19 % 7.7    Absolute CD19      100 - 500 cells/ul 100      Component      Latest Ref Rn 2/7/2023   H. influenza B Ab      mcg/mL 7.77    Diphtheria Toxoid Ab      IU/mL 0.18    Tetanus Ab      IU/mL 0.76    S.pneumoniae Type 1 <0.3    S.pneumoniae Type 3 2.9    Strep pneumo Type 4 <0.3    S.pneumoniae Type 5 1.8    Serotype 6 (6A)  2.6    Strep pneumo Type 14 2.1    S.pneumoniae Type 19F 0.8    S.pneumoniae Type 23F 0.3    S.pneumoniae Type 6B 1.4    S.pneumoniae Type 7F 2.4    Serotype 56 (18C) 1.5    Serotype 57 (19A) 6.2    S.pneumoniae Type 9V Abs 0.9      Component      Latest Ref [facility] 2/7/2023   IgG 1      382 - 929 mg/dL 457    IgG 2      242 - 700 mg/dL 277    IgG 3      22 - 176 mg/dL 26    IgG 4      4 - 86 mg/dL 7    IgG      650 - 1600 mg/dL 869    IgA Level      40 - 350 mg/dL 148    IgM      50 - 300 mg/dL 60    Total IgE      0 - 100 IU/mL <35       ALLERGEN TESTING     Immunocaps:   Component      Latest Ref [facility] 2/7/2023   Cat Dander IgE      <0.10 kU/L <0.10    Cat Epithelium Class CLASS 0    Dog Dander IgE      <0.10 kU/L <0.10    Dog Dander Class CLASS 0    Cockroach, IgE      <0.10 kU/L <0.10    Cockroach, IgE       CLASS 0    D. pteronyssinus Dust Mite IgE      <0.10 kU/L <0.10    D. pteronyssinus Class CLASS 0    D. farinae      <0.10 kU/L <0.10    D. farinae Class CLASS 0    [place]ia Grass IgE      <0.10 kU/L <0.10    B[place]ia Class CLASS 0    [place] Grass IgE      <0.10 kU/L <0.10    [place] Grass Class CLASS 0    Julian Grass IgE      <0.10 kU/L <0.10    Julian Grass Class CLASS 0    Timi Grass IgE      <0.10 kU/L <0.10    Timi Grass Class CLASS 0    A. alternata IgE      <0.10 kU/L <0.10    Altern. alternata Class CLASS 0    Aspergillus Fumigatus IgE      <0.10 kU/L <0.10    A. fumigatus Class CLASS 0    Cladosporium IgE      <0.10 kU/L <0.10    Cladosporium Class CLASS 0    Chaetomium      <0.10 kU/L <0.10    Chaetomium Glob. Class CLASS 0    Curvularia lunata      <0.10 kU/L <0.10    Curvularia Lunata Class CLASS 0    Helminthosporium Halodes IgE      <0.10 kU/L <0.10    Helminthosporium Class CLASS 0    Penicillium IgE      <0.10 kU/L <0.10    Penicillium Class CLASS 0    [place] Tree IgE      <0.10 kU/L <0.10    [place] Class CLASS 0    [place]      <0.10 kU/L <0.10    Bald Forreston Class CLASS 0   "  Maple/Prosperity IgE      <0.10 kU/L <0.10    Maple/Prosperity Class CLASS 0    [place] Tree IgE      <0.10 kU/L <0.10    [place], Class CLASS 0    Pecan [place] Tree IgE      <0.10 kU/L <0.10    Pecan, Class CLASS 0    White Pine Tree IgE      <0.10 kU/L <0.10    White Pine Class CLASS 0    Silver [place] IgE      <0.10 kU/L <0.10    Silver Birch Class CLASS 0    [place] Tree IgE      <0.10 kU/L <0.10    Horseheads Tree Class CLASS 0    [place], IgE      <0.10 kU/L <0.10    Richardson Class CLASS 0    English Plantain IgE      <0.10 kU/L <0.10    English Plantain Class CLASS 0    Marshelder IgE      <0.10 kU/L <0.10    Marshelder Class CLASS 0    MUGWORT      <0.10 kU/L <0.10    Mugwort Class CLASS 0    Ragweed, Short, IgE      <0.10 kU/L <0.10    Ragweed, Short, Class CLASS 0    Ragweed, Western IgE      <0.10 kU/L <0.10    Ragweed, Western, Class CLASS 0    Russian Thistle IgE      <0.10 kU/L <0.10    Russian Thistle Class CLASS 0    Horse Dander IgE      <0.10 kU/L <0.10    Horse Dander Class CLASS 0       IMAGING & OTHER DIAGNOSTICS     CXR 12/1/22:  FINDINGS:  The lungs are clear, with normal appearance of pulmonary vasculature.No pleural effusion.No pneumothorax.  The cardiac silhouette is normal in size. The hilar and mediastinal contours are unremarkable.  Bones are intact. Scoliosis  Impression:  No acute abnormality.    CT chest/abd/pelv 9/19/22 noted "Clear lungs. No pleural effusion or thickening. Minimal scattered fibrotic type changes mainly in the lung bases left greater than right." (See report for full read).      CHART REVIEW     Reviewed ENT note, prior allergy note, labs, imaging.      ASSESSMENT & PLAN     Karen Arango is a 74 y.o. female with     # Recurrent rhinosinusitis; frequent infections: Patient estimates she requires antibiotics for sinusitis and/or bronchitis about 3 times per year. In 2023, she had workup with [facility]. Immunocaps were negative for evidence of allergies. Total Ig's were normal. " Pneumococcal titers were low. She didn't end up getting pneumovax, but did get prevnar-20 later that year. Pneumococcal titers haven't been rechecked.  -rechecking pneumococcal titers along with total Ig's.   -if titers low, will give pneumovax and recheck titers 4-6 weeks later.   -patient is taking supplement echinacea, and she asked me about this. Informed her that the effectiveness of preventing infections isn't clear, but it isn't likely to be harmful.   -continue cetirizine daily (which she finds helpful).  -continue fluticasone nasal spray 1 SEN once to twice daily.  -continue azelastine nasal spray 1 SEN once to twice daily.  -continue sinus rinses prn.     # Reactive airways; history of wheezing: Patient denies a history of asthma, but reports she gets wheezing with her episodes of rhinosinusitis, 3-4 times per year. When this happens, she restarts her BID advair (100-50 mcg) and uses albuterol as a rescue. She reports she had normal pulm workup in 2024.  -continue current management.       Follow up: 3 months    I spent a total of 45 minutes on the day of the visit.  This includes face to face time and non-face to face time preparing to see the patient (eg, review of tests), obtaining and/or reviewing separately obtained history, documenting clinical information in the electronic or other health record, independently interpreting results and communicating results to the patient/family/caregiver, or care coordinator.    Li Vargas MD  Allergy/Immunology         [1]   Patient Active Problem List  Diagnosis    Bilateral sciatica    Essential (primary) hypertension    Gastro-esophageal reflux disease without esophagitis    Heart murmur    Insomnia    Mixed hyperlipidemia    Adjustment disorder    History of hand surgery    Bilateral hip pain   [2]   Current Outpatient Medications on File Prior to Visit   Medication Sig Dispense Refill    albuterol (VENTOLIN HFA) 90 mcg/actuation inhaler Inhale 2 puffs into  the lungs every 6 (six) hours as needed for Shortness of Breath. Rescue 18 g 3    ascorbic acid, vitamin C, (VITAMIN C) 500 MG tablet Take 500 mg by mouth once daily.      azelastine (ASTELIN) 137 mcg (0.1 %) nasal spray SPRAY ONE PUFF IN EACH NOSTRIL TWICE A DAY 30 mL 11    calcium carbonate (OS-AMANDA) 500 mg calcium (1,250 mg) tablet Take 1 tablet by mouth once daily.      cetirizine (ZYRTEC) 10 MG tablet Take 10 mg by mouth once daily.      fluticasone propionate (FLONASE) 50 mcg/actuation nasal spray INSTILL ONE SPRAY EACH NOSTRIL TWICE A DAY 16 g 10    fluticasone-salmeterol diskus inhaler 100-50 mcg Inhale 1 puff into the lungs 2 (two) times daily. Controller 60 each 0    LOTEMAX SM 0.38 % DrpG Place 1 drop into the right eye 3 (three) times daily.      pravastatin (PRAVACHOL) 40 MG tablet Take 1 tablet (40 mg total) by mouth once daily. 90 tablet 3    RESTASIS 0.05 % ophthalmic emulsion Place 1 drop into both eyes 2 (two) times daily.      vitamin D (VITAMIN D3) 1000 units Tab Take 1,000 Units by mouth once daily.      zinc sulfate (ZINCATE) 50 mg zinc (220 mg) capsule Take 220 mg by mouth once daily.      methylPREDNISolone (MEDROL DOSEPACK) 4 mg tablet use as directed (Patient not taking: Reported on 2/24/2025) 1 each 0     No current facility-administered medications on file prior to visit.   [3]   Social History  Tobacco Use    Smoking status: Never     Passive exposure: Never    Smokeless tobacco: Never   Substance Use Topics    Alcohol use: Yes     Comment: rarely

## 2025-02-27 LAB
IMMUNOLOGIST REVIEW: NORMAL
S PN DA SERO 19F IGG SER-MCNC: 0.5 MCG/ML
S PNEUM DA 1 IGG SER-MCNC: 0.2 MCG/ML
S PNEUM DA 10A IGG SER-MCNC: 2.1 MCG/ML
S PNEUM DA 11A IGG SER-MCNC: 1.4 MCG/ML
S PNEUM DA 12F IGG SER-MCNC: 0.3 MCG/ML
S PNEUM DA 14 IGG SER-MCNC: 3.5 MCG/ML
S PNEUM DA 15B IGG SER-MCNC: 4.6 MCG/ML
S PNEUM DA 17F IGG SER-MCNC: 0.3 MCG/ML
S PNEUM DA 18C IGG SER-MCNC: 0.4 MCG/ML
S PNEUM DA 19A IGG SER-MCNC: NORMAL MCG/ML
S PNEUM DA 2 IGG SER-MCNC: 0.3 MCG/ML
S PNEUM DA 20A IGG SER-MCNC: 0.6 MCG/ML
S PNEUM DA 22F IGG SER-MCNC: 0.2 MCG/ML
S PNEUM DA 23F IGG SER-MCNC: 0.4 MCG/ML
S PNEUM DA 3 IGG SER-MCNC: 1.1 MCG/ML
S PNEUM DA 33F IGG SER-MCNC: 0.5 MCG/ML
S PNEUM DA 4 IGG SER-MCNC: 0.1 MCG/ML
S PNEUM DA 5 IGG SER-MCNC: 0.2 MCG/ML
S PNEUM DA 6B IGG SER-MCNC: 0.4 MCG/ML
S PNEUM DA 7F IGG SER-MCNC: 0.5 MCG/ML
S PNEUM DA 8 IGG SER-MCNC: 0.7 MCG/ML
S PNEUM DA 9N IGG SER-MCNC: 0.2 MCG/ML
S PNEUM DA 9V IGG SER-MCNC: 1.4 MCG/ML

## 2025-03-10 ENCOUNTER — PATIENT MESSAGE (OUTPATIENT)
Dept: ALLERGY | Facility: CLINIC | Age: 75
End: 2025-03-10
Payer: MEDICARE

## 2025-03-10 DIAGNOSIS — Z86.19 FREQUENT INFECTIONS: Primary | ICD-10-CM

## 2025-03-10 DIAGNOSIS — R76.0 ABNORMAL ANTIBODY TITER: ICD-10-CM

## 2025-03-13 ENCOUNTER — TELEPHONE (OUTPATIENT)
Dept: ALLERGY | Facility: CLINIC | Age: 75
End: 2025-03-13
Payer: MEDICARE

## 2025-03-13 NOTE — TELEPHONE ENCOUNTER
"----- Message from Li Vargas MD sent at 3/10/2025 11:04 AM CDT -----  Regarding: call with results and to set up vaccine visit  Hello,This patient has low pneumococcal titers. I sent her a portal message explaining results, but she had told me that she isn't good about checking portal messages. Can you please call with results. Here is the message I sent "The immune workup showed that the total levels of immunoglobulins (antibodies) are normal, but the specific antibody protection against pneumococcal bacteria is low. The next step is to get a pneumococcal vaccine (called pneumovax) and then recheck these labs 4-6 weeks later to see how the immune system responds. I will have one of our staff call or message you to schedule the nurse visit for the vaccine."And can you schedule an injection visit for pneumovax vaccine (23 valent). She should get repeat pneumococcal titer labs 4-6 weeks after the vaccine. I have put in orders for both the vaccine and the labs.Thanks,Li Vargas, [facility]/Immunology  "
Attempt to contact patient was unsuccessful.  Left a voice massage to call office to set up an injection appointment for pneumovax 23.  Also, spoke with patient's daughter about this, she will communicate with patient about it since patient do not have access to the portal.  Mrs Masterson will call to set up appointment as soon she speak with patient.    
<<----- Click to add NO pertinent Family History

## 2025-03-13 NOTE — TELEPHONE ENCOUNTER
----- Message from Ellie sent at 3/13/2025  1:11 PM CDT -----  Regarding: schedule inj  Contact: Pt @ [phone]  Pt is calling to speak with someone in the office to schedule an injection. Please call pt to schedule.

## 2025-03-17 ENCOUNTER — TELEPHONE (OUTPATIENT)
Dept: ALLERGY | Facility: CLINIC | Age: 75
End: 2025-03-17
Payer: MEDICARE

## 2025-03-17 NOTE — TELEPHONE ENCOUNTER
Patient was scheduled for Pneumovax 23 injection on 03/25/25 and follow up on 05/19/25.  Patient verbalized  understanding

## 2025-03-25 ENCOUNTER — CLINICAL SUPPORT (OUTPATIENT)
Dept: ALLERGY | Facility: CLINIC | Age: 75
End: 2025-03-25
Payer: MEDICARE

## 2025-03-25 DIAGNOSIS — Z86.19 FREQUENT INFECTIONS: Primary | ICD-10-CM

## 2025-03-25 DIAGNOSIS — R76.0 ABNORMAL ANTIBODY TITER: ICD-10-CM

## 2025-03-25 PROCEDURE — G0009 ADMIN PNEUMOCOCCAL VACCINE: HCPCS | Mod: S$GLB,,, | Performed by: STUDENT IN AN ORGANIZED HEALTH CARE EDUCATION/TRAINING PROGRAM

## 2025-03-25 PROCEDURE — 90732 PPSV23 VACC 2 YRS+ SUBQ/IM: CPT | Mod: S$GLB,,, | Performed by: STUDENT IN AN ORGANIZED HEALTH CARE EDUCATION/TRAINING PROGRAM

## 2025-04-28 ENCOUNTER — LAB VISIT (OUTPATIENT)
Dept: LAB | Facility: HOSPITAL | Age: 75
End: 2025-04-28
Attending: STUDENT IN AN ORGANIZED HEALTH CARE EDUCATION/TRAINING PROGRAM
Payer: MEDICARE

## 2025-04-28 DIAGNOSIS — R76.0 ABNORMAL ANTIBODY TITER: ICD-10-CM

## 2025-04-28 DIAGNOSIS — Z86.19 FREQUENT INFECTIONS: ICD-10-CM

## 2025-04-28 PROCEDURE — 36415 COLL VENOUS BLD VENIPUNCTURE: CPT

## 2025-04-28 PROCEDURE — 86581 STRPTCS PNEUM ANTB SEROT IA: CPT

## 2025-04-29 ENCOUNTER — OFFICE VISIT (OUTPATIENT)
Dept: OTOLARYNGOLOGY | Facility: CLINIC | Age: 75
End: 2025-04-29
Payer: MEDICARE

## 2025-04-29 VITALS — WEIGHT: 176.38 LBS | HEIGHT: 63 IN | BODY MASS INDEX: 31.25 KG/M2

## 2025-04-29 DIAGNOSIS — J30.1 SEASONAL ALLERGIC RHINITIS DUE TO POLLEN: Chronic | ICD-10-CM

## 2025-04-29 DIAGNOSIS — J31.0 CHRONIC RHINITIS: Primary | Chronic | ICD-10-CM

## 2025-04-29 DIAGNOSIS — J32.9 RECURRENT SINUSITIS: Chronic | ICD-10-CM

## 2025-04-29 DIAGNOSIS — J30.89 NON-SEASONAL ALLERGIC RHINITIS DUE TO OTHER ALLERGIC TRIGGER: Chronic | ICD-10-CM

## 2025-04-29 PROCEDURE — 99214 OFFICE O/P EST MOD 30 MIN: CPT | Mod: S$GLB,,, | Performed by: OTOLARYNGOLOGY

## 2025-04-29 PROCEDURE — 1160F RVW MEDS BY RX/DR IN RCRD: CPT | Mod: CPTII,S$GLB,, | Performed by: OTOLARYNGOLOGY

## 2025-04-29 PROCEDURE — 1101F PT FALLS ASSESS-DOCD LE1/YR: CPT | Mod: CPTII,S$GLB,, | Performed by: OTOLARYNGOLOGY

## 2025-04-29 PROCEDURE — 3288F FALL RISK ASSESSMENT DOCD: CPT | Mod: CPTII,S$GLB,, | Performed by: OTOLARYNGOLOGY

## 2025-04-29 PROCEDURE — 99999 PR PBB SHADOW E&M-EST. PATIENT-LVL III: CPT | Mod: PBBFAC,,, | Performed by: OTOLARYNGOLOGY

## 2025-04-29 PROCEDURE — 1126F AMNT PAIN NOTED NONE PRSNT: CPT | Mod: CPTII,S$GLB,, | Performed by: OTOLARYNGOLOGY

## 2025-04-29 PROCEDURE — 1159F MED LIST DOCD IN RCRD: CPT | Mod: CPTII,S$GLB,, | Performed by: OTOLARYNGOLOGY

## 2025-04-29 RX ORDER — AZELASTINE 1 MG/ML
1 SPRAY, METERED NASAL 2 TIMES DAILY
Qty: 30 ML | Refills: 11 | Status: SHIPPED | OUTPATIENT
Start: 2025-04-29

## 2025-04-29 RX ORDER — FLUTICASONE PROPIONATE 50 MCG
1 SPRAY, SUSPENSION (ML) NASAL 2 TIMES DAILY
Qty: 16 G | Refills: 11 | Status: SHIPPED | OUTPATIENT
Start: 2025-04-29

## 2025-04-29 NOTE — PROGRESS NOTES
Chief Complaint   Patient presents with    Follow-up     Pt stated that her sinus is much better        HPI 1/2025:  Patient is a 74 y.o. female who has previously seen me for chronic rhinitis, recurrent sinusitis, allergic bronchitis.      She recently had URI and completed evisit for those symptoms.  The patient was started on Levaquin at the visit but does not feel she has had any change in symptoms.  She was previously treated with azithromycin, prednisone, steroid shot, nebulized albuterol at her urgent care visit when symptoms began.  She does not feel anything has improved.  She still has thick postnasal drip, nasal congestion, cough, wheezing.  She had previously been seen by Allergy/immunology for workup, but did not have further follow-up, and asthma issues were not addressed at that visit.    Interval HPI 04/29/2025 :      Patient has overall been doing well.  She has been using her nasal sprays and daily antihistamine.  She has not required her inhaler.  She is a follow-up with Allergy/immunology in May.  She had pneumococcal booster and will go over results of post vaccine testing.      Active Ambulatory Problems     Diagnosis Date Noted    Bilateral sciatica 12/08/2017    Essential (primary) hypertension 10/27/2015    Gastro-esophageal reflux disease without esophagitis 10/27/2015    Heart murmur 06/07/2016    Insomnia 12/13/2018    Mixed hyperlipidemia 10/27/2015    Adjustment disorder 03/22/2021    History of hand surgery 06/13/2023    Bilateral hip pain 06/14/2024     Resolved Ambulatory Problems     Diagnosis Date Noted    Tachyarrhythmia 09/17/2019    Cholelithiasis with acute cholecystitis 09/20/2022    Cholecystitis 09/20/2022     Past Medical History:   Diagnosis Date    Allergy     Recurrent upper respiratory infection (URI)        Review of Systems  General: negative for chills, fever or weight loss  Psychological: negative for mood changes or depression  Ophthalmic: negative for blurry  vision, photophobia or eye pain  ENT: see HPI  Respiratory: no cough, shortness of breath, or wheezing  Cardiovascular: no chest pain or dyspnea on exertion  Gastrointestinal: no abdominal pain, change in bowel habits, or black/ bloody stools  Musculoskeletal: negative for gait disturbance or muscular weakness  Neurological: no syncope or seizures; no ataxia  Dermatological: negative for pruritis, rash and jaundice  Hematologic/lymphatic: no easy bruising, no new adenopathy    Physical Exam     There were no vitals filed for this visit.          Constitutional:   She is oriented to person, place, and time. Vital signs are normal. She appears well-developed and well-nourished. She appears alert. She is cooperative. Normal speech.      Head:  Normocephalic and atraumatic. Salivary glands normal.  Facial strength is normal.      Ears:    Right Ear: Tympanic membrane is not erythematous and not retracted. No middle ear effusion.   Left Ear: Tympanic membrane is not erythematous and not retracted.  No middle ear effusion.     Nose:  Mucosal edema present. No rhinorrhea, septal deviation or polyps. Turbinate hypertrophy (2-3+, congestion improved, mild erythema).  Turbinates normal.  Right sinus exhibits no maxillary sinus tenderness (slight) and no frontal sinus tenderness. Left sinus exhibits no maxillary sinus tenderness (slight) and no frontal sinus tenderness.     Mouth/Throat  Oropharynx clear and moist without lesions or asymmetry, lips, teeth, and gums normal and oropharynx normal. No mucous membrane lesions. Posterior oropharyngeal erythema (mild) present. No oropharyngeal exudate or posterior oropharyngeal edema. Mirror exam not performed due to patient tolerance.  Mirror exam not performed due to patient tolerance.      Neck:  Neck normal without thyromegaly masses, asymmetry, normal tracheal structure, crepitus, and tenderness, thyroid normal, trachea normal, phonation normal, full range of motion with neck  supple and no adenopathy. No JVD present. Carotid bruit is not present. No thyroid mass and no thyromegaly present.     She has no cervical adenopathy.     Cardiovascular:    Normal rate, regular rhythm and rate and rhythm, heart sounds, and pulses normal.              Pulmonary/Chest:   Effort and breath sounds normal. She has no decreased breath sounds. She has no wheezes (mild wheezes noted on initial inspiration bilateral upper, resolved with subsequent breaths.). She has no rhonchi. She has no rales.     Psychiatric:   She has a normal mood and affect. Her speech is normal and behavior is normal.     Neurological:   She is alert and oriented to person, place, and time. She has neurological normal, alert and oriented. No cranial nerve deficit.     Skin:   No abrasions, lacerations, lesions, or rashes.         Assessment/Plan:      ICD-10-CM ICD-9-CM    1. Chronic rhinitis  J31.0 472.0       2. Seasonal allergic rhinitis due to pollen  J30.1 477.0 azelastine (ASTELIN) 137 mcg (0.1 %) nasal spray      fluticasone propionate (FLONASE) 50 mcg/actuation nasal spray      3. Non-seasonal allergic rhinitis due to other allergic trigger  J30.89 477.8       4. Recurrent sinusitis  J32.9 473.9               Keep follow-up with .  Continue nasal sprays, refills given of azelastine and fluticasone.  Continue antihistamine daily.  Patient will follow-up with me on her normal schedule of every 4-6 months, or she will notify me if things change before that time.    Terrie Stringer MD  [facility]

## 2025-04-29 NOTE — PATIENT INSTRUCTIONS
Continue nasal sprays and daily zytrec.  Refilled nasal sprays today.     Follow up with Dr. Vargas for allergy and immune treatments.

## 2025-04-30 LAB
IMMUNOLOGIST REVIEW: NORMAL
S PN DA SERO 19F IGG SER-MCNC: 0.6 MCG/ML
S PNEUM DA 1 IGG SER-MCNC: 0.3 MCG/ML
S PNEUM DA 10A IGG SER-MCNC: 2.9 MCG/ML
S PNEUM DA 11A IGG SER-MCNC: 2.3 MCG/ML
S PNEUM DA 12F IGG SER-MCNC: 0.2 MCG/ML
S PNEUM DA 14 IGG SER-MCNC: 4.7 MCG/ML
S PNEUM DA 15B IGG SER-MCNC: 9.3 MCG/ML
S PNEUM DA 17F IGG SER-MCNC: 1 MCG/ML
S PNEUM DA 18C IGG SER-MCNC: 0.7 MCG/ML
S PNEUM DA 19A IGG SER-MCNC: 1.4 MCG/ML
S PNEUM DA 2 IGG SER-MCNC: 0.6 MCG/ML
S PNEUM DA 20A IGG SER-MCNC: 0.8 MCG/ML
S PNEUM DA 22F IGG SER-MCNC: 0.4 MCG/ML
S PNEUM DA 23F IGG SER-MCNC: 0.5 MCG/ML
S PNEUM DA 3 IGG SER-MCNC: 1.5 MCG/ML
S PNEUM DA 33F IGG SER-MCNC: 0.5 MCG/ML
S PNEUM DA 4 IGG SER-MCNC: 0.3 MCG/ML
S PNEUM DA 5 IGG SER-MCNC: 0.4 MCG/ML
S PNEUM DA 6B IGG SER-MCNC: 0.5 MCG/ML
S PNEUM DA 7F IGG SER-MCNC: 1.1 MCG/ML
S PNEUM DA 8 IGG SER-MCNC: 1 MCG/ML
S PNEUM DA 9N IGG SER-MCNC: 1 MCG/ML
S PNEUM DA 9V IGG SER-MCNC: 2 MCG/ML

## 2025-05-19 ENCOUNTER — OFFICE VISIT (OUTPATIENT)
Dept: ALLERGY | Facility: CLINIC | Age: 75
End: 2025-05-19
Payer: MEDICARE

## 2025-05-19 VITALS — BODY MASS INDEX: 31.45 KG/M2 | WEIGHT: 177.5 LBS | HEIGHT: 63 IN

## 2025-05-19 DIAGNOSIS — D80.6 ANTI-PNEUMOCOCCAL POLYSACCHARIDE ANTIBODY DEFICIENCY: Primary | ICD-10-CM

## 2025-05-19 DIAGNOSIS — J32.9 RECURRENT SINUSITIS: ICD-10-CM

## 2025-05-19 DIAGNOSIS — J31.0 CHRONIC RHINITIS: ICD-10-CM

## 2025-05-19 PROCEDURE — 99214 OFFICE O/P EST MOD 30 MIN: CPT | Mod: S$GLB,,, | Performed by: STUDENT IN AN ORGANIZED HEALTH CARE EDUCATION/TRAINING PROGRAM

## 2025-05-19 PROCEDURE — 1126F AMNT PAIN NOTED NONE PRSNT: CPT | Mod: CPTII,S$GLB,, | Performed by: STUDENT IN AN ORGANIZED HEALTH CARE EDUCATION/TRAINING PROGRAM

## 2025-05-19 PROCEDURE — 99999 PR PBB SHADOW E&M-EST. PATIENT-LVL III: CPT | Mod: PBBFAC,,, | Performed by: STUDENT IN AN ORGANIZED HEALTH CARE EDUCATION/TRAINING PROGRAM

## 2025-05-19 PROCEDURE — 3288F FALL RISK ASSESSMENT DOCD: CPT | Mod: CPTII,S$GLB,, | Performed by: STUDENT IN AN ORGANIZED HEALTH CARE EDUCATION/TRAINING PROGRAM

## 2025-05-19 PROCEDURE — 1160F RVW MEDS BY RX/DR IN RCRD: CPT | Mod: CPTII,S$GLB,, | Performed by: STUDENT IN AN ORGANIZED HEALTH CARE EDUCATION/TRAINING PROGRAM

## 2025-05-19 PROCEDURE — 1159F MED LIST DOCD IN RCRD: CPT | Mod: CPTII,S$GLB,, | Performed by: STUDENT IN AN ORGANIZED HEALTH CARE EDUCATION/TRAINING PROGRAM

## 2025-05-19 PROCEDURE — 1101F PT FALLS ASSESS-DOCD LE1/YR: CPT | Mod: CPTII,S$GLB,, | Performed by: STUDENT IN AN ORGANIZED HEALTH CARE EDUCATION/TRAINING PROGRAM

## 2025-05-19 NOTE — PROGRESS NOTES
ALLERGY & IMMUNOLOGY CLINIC - FOLLOW UP    This note was generated with the assistance of ambient listening technology. Verbal consent was obtained by the patient and accompanying visitor(s) for the recording of patient appointment to facilitate this note. I attest to having reviewed and edited the generated note for accuracy, though some syntax or spelling errors may persist. Please contact the author of this note for any clarification.     HISTORY OF PRESENT ILLNESS     Patient ID: Karen Arango is a 74 y.o. female    CC: specific polysaccharide antibody deficiency     HPI: Karen Arango is a 74 y.o. 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     She reports she is doing much better, and hasn't been sick since January.   She remains on the same regimen for rhinitis, using her flonase and azelastine nasal sprays, as well as zyrtec.  She denies any recent wheezing.      MEDICAL HISTORY     Vaccines:   Immunization History   Administered Date(s) Administered    COVID-19, MRNA, LN-S, PF (Pfizer) (Purple Cap) 02/01/2021, 02/23/2021, 01/07/2022    Influenza 10/23/2019, 10/08/2020    Influenza (FLUAD) - Quadrivalent - Adjuvanted - PF *Preferred* (65+) 10/08/2020, 11/12/2021, 11/19/2022    Influenza - Quadrivalent - PF *Preferred* (6 months and older) 10/02/2018, 10/23/2019    Influenza - Trivalent - Afluria, Fluzone MDV 10/28/2013, 10/21/2014    Influenza - Trivalent - Fluzone High Dose - PF (65 years and older) 10/23/2019, 11/18/2024    Pneumococcal Conjugate - 20 Valent 09/19/2023    Pneumococcal Polysaccharide - 23 Valent 03/25/2025    Zoster 11/11/2019, 02/26/2020    Zoster Recombinant 11/11/2019, 02/26/2020     Medical Hx:   Problem List[1]    Surgical Hx:   Past Surgical History:   Procedure Laterality Date    LAPAROSCOPIC CHOLECYSTECTOMY N/A 09/20/2022    Procedure: CHOLECYSTECTOMY, LAPAROSCOPIC;  Surgeon: Saud Cooley MD;  Location: [facility] OR;   "Service: General;  Laterality: N/A;    TONSILLECTOMY       Medications:   Medications Ordered Prior to Encounter[2]    Drug Allergies:   Review of patient's allergies indicates:   Allergen Reactions    Adhesive tape-silicones      Other reaction(s): Blisters     Social Hx:   Social History[3]    Additional History Obtained at Initial Visit:  H/o Asthma: denies, but endorses asthma symptoms when she gets rhinosinusitis   H/o Rhinitis: endorses  Env/Occ:   Pets: no     PHYSICAL EXAM     VS: Ht 5' 3" (1.6 m)   Wt 80.5 kg (177 lb 7.5 oz)   BMI 31.44 kg/m²   GENERAL: Alert, NAD, well-appearing  EYES: EOMI, no conjunctival injection, no discharge, no infraorbital shiners  LUNGS: normal effort, no increased WOB  DERM: no rashes     LABORATORY STUDIES     Component      Latest Ref Rng 2/7/2023 2/24/2025 4/28/2025   S.pneumoniae Type 1      >=1.0 mcg/mL <0.3  0.2     Pneumococcal Serotype 2 IgG (PNX)      >=1.0 mcg/mL  0.3     S.pneumoniae Type 3      >=1.0 mcg/mL 2.9  1.1     Pneumococcal Serotype 4 IgG  (P7,P13,PNX)      >=1.0 mcg/mL  0.1     S.pneumoniae Type 5      >=1.0 mcg/mL 1.8  0.2     S.pneumoniae Type 8      >=1.0 mcg/mL  0.7     S.pneumoniae Type 9N      >=1.0 mcg/mL  0.2     S.pneumoniae Type 12F      >=1.0 mcg/mL  0.3     Pneumococcal Serotype 14 IgG (P7,P13,PNX)      >=1.0 mcg/mL  3.5     Pneumococcal Serotype 17F IgG (PNX)      >=1.0 mcg/mL  0.3     S.pneumoniae Type 19F      >=1.0 mcg/mL 0.8  0.5     Pneumococcal Serotype 20 IgG (PNX)      >=1.0 mcg/mL  0.6     Pneumococcal Serotype 22F IgG (PNX)      >=1.0 mcg/mL  0.2     S.pneumoniae Type 23F      >=1.0 mcg/mL 0.3  0.4     S.pneumoniae Type 6B      >=1.0 mcg/mL 1.4  0.4     Pneumococcal Serotype 10A IgG (PNX)      >=1.0 mcg/mL  2.1     Pneumococcal Serotype 11A IgG (PNX)      >=1.0 mcg/mL  1.4     S.pneumoniae Type 7F      >=1.0 mcg/mL 2.4  0.5     Pneumococcal Serotype 15B IgG (PNX)      >=1.0 mcg/mL  4.6     S.pneumoniae Type 18C      >=1.0 mcg/mL  0.4 "     Pneumococcal Serotype 19A IgG (P13,PNX)      >=1.0 mcg/mL  SEE BELOW     S.pneumoniae Type 9V Abs      >=1.0 mcg/mL 0.9  1.4     Pneumococcal Serotype 33 IgG (PNX)      >=1.0 mcg/mL  0.5     PN23 Interpretation  SEE BELOW     Serotype 1 (1)      >=1.0 mcg/mL   0.3    Serotype 2(2)      >=1.0 mcg/mL   0.6    Serotype 3 (3)      >=1.0 mcg/mL   1.5    Serotype 4 (4)      >=1.0 mcg/mL   0.3    Serotype 5 (5)      >=1.0 mcg/mL   0.4    Serotype 8 (8)      >=1.0 mcg/mL   1.0    Serotype 9N (9)      >=1.0 mcg/mL   1.0    Serotype 12F (12)      >=1.0 mcg/mL   0.2    Serotype 14 (14)      >=1.0 mcg/mL   4.7    Serotype 17F (17)      >=1.0 mcg/mL   1.0    Serotype 19F (19)      >=1.0 mcg/mL   0.6    Serotype 20 (20)      >=1.0 mcg/mL   0.8    Serotype 22F (22)      >=1.0 mcg/mL   0.4    Serotype 23F (23)      >=1.0 mcg/mL   0.5    Serotype 6B (26)      >=1.0 mcg/mL   0.5    Serotype 10A (34)      >=1.0 mcg/mL   2.9    Serotype 11A (43)      >=1.0 mcg/mL   2.3    Serotype 7F (51)      >=1.0 mcg/mL   1.1    Serotype 15B (54)      >=1.0 mcg/mL   9.3    Serotype 18C (56)      >=1.0 mcg/mL   0.7    Serotype 19A (57)      >=1.0 mcg/mL   1.4    Serotype 9V (68)      >=1.0 mcg/mL   2.0    Serotype 33F (70)      >=1.0 mcg/mL   0.5    Interpretation   SEE COMMENTS    H. influenza B Ab      mcg/mL 7.77      Diphtheria Toxoid Ab      IU/mL 0.18      Tetanus Ab      IU/mL 0.76      Strep pneumo Type 4 <0.3      Serotype 6 (6A) 2.6      Strep pneumo Type 14 2.1      Serotype 56 (18C) 1.5      Serotype 57 (19A) 6.2        Component      Latest Ref Rng 2/7/2023 2/24/2025   IgG 1      382 - 929 mg/dL 457     IgG 2      242 - 700 mg/dL 277     IgG 3      22 - 176 mg/dL 26     IgG 4      4 - 86 mg/dL 7     IgG      650 - 1600 mg/dL 869  874    IgA Level      40 - 350 mg/dL 148  139    IgM      50 - 300 mg/dL 60  70    Total IgE      0 - 100 IU/mL <35       Component      Latest Ref Rng 2/7/2023 6/8/2023 6/7/2024   WBC      3.90 - 12.70 K/uL  3.62 (L)  4.28  4.38    RBC      4.00 - 5.40 M/uL 4.38  4.12  4.24    Hemoglobin      12.0 - 16.0 g/dL 13.1  12.4  13.0    Hematocrit      37.0 - 48.5 % 41.0  37.9  38.6    MCV      82 - 98 fL 94  92  91    MCH      27.0 - 31.0 pg 29.9  30.1  30.7    MCHC      32.0 - 36.0 g/dL 32.0  32.7  33.7    RDW      11.5 - 14.5 % 13.0  12.9  13.1    Platelet Count      150 - 450 K/uL 266  225  210    MPV      9.2 - 12.9 fL 9.7  10.0  10.0    Immature Granulocytes      0.0 - 0.5 % 0.3  0.2  0.5    Gran # (ANC)      1.8 - 7.7 K/uL 1.8  1.9  2.0    Immature Grans (Abs)      0.00 - 0.04 K/uL 0.01  0.01  0.02    Lymph #      1.0 - 4.8 K/uL 1.3  1.7  1.6    Mono #      0.3 - 1.0 K/uL 0.5  0.5  0.6    Eos #      0.0 - 0.5 K/uL 0.1  0.1  0.1    Baso #      0.00 - 0.20 K/uL 0.03  0.04  0.06    Differential Method Automated  Automated  Automated    Sodium      136 - 145 mmol/L  142  142    Potassium      3.5 - 5.1 mmol/L  4.1  4.1    Chloride      95 - 110 mmol/L  108  107    CO2      23 - 29 mmol/L  25  26    Glucose      70 - 110 mg/dL  107  102    BUN      8 - 23 mg/dL  15  14    Creatinine      0.5 - 1.4 mg/dL  0.9  0.8    Calcium      8.7 - 10.5 mg/dL  9.4  9.5    PROTEIN TOTAL      6.0 - 8.4 g/dL  6.7  6.6    Albumin      3.5 - 5.2 g/dL  3.9  3.7    BILIRUBIN TOTAL      0.1 - 1.0 mg/dL  0.4  0.6    ALP      55 - 135 U/L  72  76    AST      10 - 40 U/L  19  14    ALT      10 - 44 U/L  24  18      Component      Latest Ref Rng 2/7/2023   CD3 % Total T Cell      55 - 83 % 85.9 (H)    Absolute CD3      700 - 2100 cells/ul 1130    CD8 % Suppressor T Cell      10 - 39 % 27.6    Absolute CD8      200 - 900 cells/ul 363    CD4 % [place] T Cell      28 - 57 % 56.9    Absolute CD4      300 - 1400 cells/ul 748    CD4/CD8 Ratio      0.9 - 3.6  2.06    CD56 + CD16 Natural Killers      7 - 31 % 5.3 (L)    Absolute CD56 + CD16      90 - 600 cells/ul 69 (L)    CD19 B Cells      6 - 19 % 7.7    Absolute CD19      100 - 500 cells/ul 100        ALLERGEN TESTING     Immunocaps:   Component      Latest Ref [facility] 2/7/2023   Cat Dander IgE      <0.10 kU/L <0.10    Cat Epithelium Class CLASS 0    Dog Dander IgE      <0.10 kU/L <0.10    Dog Dander Class CLASS 0    Cockroach, IgE      <0.10 kU/L <0.10    Cockroach, IgE       CLASS 0    D. pteronyssinus Dust Mite IgE      <0.10 kU/L <0.10    D. pteronyssinus Class CLASS 0    D. farinae      <0.10 kU/L <0.10    D. farinae Class CLASS 0    [place] Grass IgE      <0.10 kU/L <0.10    [place] Class CLASS 0    [place] Grass IgE      <0.10 kU/L <0.10    [place] Grass Class CLASS 0    Julian Grass IgE      <0.10 kU/L <0.10    Julian Grass Class CLASS 0    Timi Grass IgE      <0.10 kU/L <0.10    Timi Grass Class CLASS 0    A. alternata IgE      <0.10 kU/L <0.10    Altern. alternata Class CLASS 0    Aspergillus Fumigatus IgE      <0.10 kU/L <0.10    A. fumigatus Class CLASS 0    Cladosporium IgE      <0.10 kU/L <0.10    Cladosporium Class CLASS 0    Chaetomium      <0.10 kU/L <0.10    Chaetomium Glob. Class CLASS 0    Curvularia lunata      <0.10 kU/L <0.10    Curvularia Lunata Class CLASS 0    Helminthosporium Halodes IgE      <0.10 kU/L <0.10    Helminthosporium Class CLASS 0    Penicillium IgE      <0.10 kU/L <0.10    Penicillium Class CLASS 0    [place] Tree IgE      <0.10 kU/L <0.10    [place] Class CLASS 0    [place]      <0.10 kU/L <0.10    Bald [place] Class CLASS 0    Maple/Irving IgE      <0.10 kU/L <0.10    Maple/Irving Class CLASS 0    [place] Tree IgE      <0.10 kU/L <0.10    [place], Class CLASS 0    Pecan [place] Tree IgE      <0.10 kU/L <0.10    Pecan, Class CLASS 0    White Pine Tree IgE      <0.10 kU/L <0.10    [place] Class CLASS 0    Silver [place] IgE      <0.10 kU/L <0.10    Silver [place] Class CLASS 0    [place] Tree IgE      <0.10 kU/L <0.10    [place] Tree Class CLASS 0    [place], IgE      <0.10 kU/L <0.10    [place] Class CLASS 0    English Plantain IgE      <0.10 kU/L <0.10    English  "Plantain Class CLASS 0    Marshelder IgE      <0.10 kU/L <0.10    Marshelder Class CLASS 0    MUGWORT      <0.10 kU/L <0.10    Mugwort Class CLASS 0    Ragweed, Short, IgE      <0.10 kU/L <0.10    Ragweed, Short, Class CLASS 0    Ragweed, Western IgE      <0.10 kU/L <0.10    Ragweed, Western, Class CLASS 0    Russian Thistle IgE      <0.10 kU/L <0.10    Russian Thistle Class CLASS 0    Horse Dander IgE      <0.10 kU/L <0.10    Horse Dander Class CLASS 0       IMAGING & OTHER DIAGNOSTICS     CXR 12/1/22:  FINDINGS:  The lungs are clear, with normal appearance of pulmonary vasculature.No pleural effusion.No pneumothorax.  The cardiac silhouette is normal in size. The hilar and mediastinal contours are unremarkable.  Bones are intact. Scoliosis  Impression:  No acute abnormality.    CT chest/abd/pelv 9/19/22 noted "Clear lungs. No pleural effusion or thickening. Minimal scattered fibrotic type changes mainly in the lung bases left greater than right." (See report for full read).      CHART REVIEW     Reviewed ENT note.     ASSESSMENT & PLAN     Karen Arango is a 74 y.o. female with     # Specific polysaccharide antibody deficiency; history of recurrent sinusitis: Patient estimated requiring antibiotics for sinusitis and/or bronchitis about 3 times per year. But says she is doing better currently; hasn't been sick since 1/2025. Total Ig's normal. Lymphocyte flow cytometry with CD19 B cells at low-end of normal. Pneumococcal titers low despite receiving prevnar-20 in 2023, and without appropriate response to pneumovax in 2025. Counseled on her diagnosis and the varied presentations. Discussed option of immunoglobulin replacement therapy if she were to start getting more frequent infections requiring antibiotics. Patient not currently interested in this treatment option.     # Chronic rhinitis: 2023 immunocaps negative for evidence of allergy. She reports symptoms currently well controlled.   -continue cetirizine daily " (which she finds helpful).  -continue fluticasone nasal spray 1 SEN once to twice daily.  -continue azelastine nasal spray 1 SEN once to twice daily.  -continue sinus rinses prn.       Follow up: 1 year or sooner if needed    I spent a total of 35 minutes on the day of the visit.  This includes face to face time and non-face to face time preparing to see the patient (eg, review of tests), obtaining and/or reviewing separately obtained history, documenting clinical information in the electronic or other health record, independently interpreting results and communicating results to the patient/family/caregiver, or care coordinator.    Li Vargas MD  Allergy/Immunology         [1]   Patient Active Problem List  Diagnosis    Bilateral sciatica    Essential (primary) hypertension    Gastro-esophageal reflux disease without esophagitis    Heart murmur    Insomnia    Mixed hyperlipidemia    Adjustment disorder    History of hand surgery    Bilateral hip pain   [2]   Current Outpatient Medications on File Prior to Visit   Medication Sig Dispense Refill    albuterol (VENTOLIN HFA) 90 mcg/actuation inhaler Inhale 2 puffs into the lungs every 6 (six) hours as needed for Shortness of Breath. Rescue 18 g 3    ascorbic acid, vitamin C, (VITAMIN C) 500 MG tablet Take 500 mg by mouth once daily.      azelastine (ASTELIN) 137 mcg (0.1 %) nasal spray 1 spray (137 mcg total) by Nasal route 2 (two) times daily. 30 mL 11    calcium carbonate (OS-AMANDA) 500 mg calcium (1,250 mg) tablet Take 1 tablet by mouth once daily.      cetirizine (ZYRTEC) 10 MG tablet Take 10 mg by mouth once daily.      fluticasone propionate (FLONASE) 50 mcg/actuation nasal spray 1 spray (50 mcg total) by Each Nostril route 2 (two) times a day. 16 g 11    fluticasone-salmeterol diskus inhaler 100-50 mcg Inhale 1 puff into the lungs 2 (two) times daily. Controller 60 each 0    LOTEMAX SM 0.38 % DrpG Place 1 drop into the right eye 3 (three) times daily.       methylPREDNISolone (MEDROL DOSEPACK) 4 mg tablet use as directed 1 each 0    pravastatin (PRAVACHOL) 40 MG tablet Take 1 tablet (40 mg total) by mouth once daily. 90 tablet 3    RESTASIS 0.05 % ophthalmic emulsion Place 1 drop into both eyes 2 (two) times daily.      vitamin D (VITAMIN D3) 1000 units Tab Take 1,000 Units by mouth once daily.      zinc sulfate (ZINCATE) 50 mg zinc (220 mg) capsule Take 220 mg by mouth once daily.       No current facility-administered medications on file prior to visit.   [3]   Social History  Tobacco Use    Smoking status: Never     Passive exposure: Never    Smokeless tobacco: Never   Substance Use Topics    Alcohol use: Yes     Comment: rarely

## 2025-06-19 ENCOUNTER — LAB VISIT (OUTPATIENT)
Dept: LAB | Facility: HOSPITAL | Age: 75
End: 2025-06-19
Attending: INTERNAL MEDICINE
Payer: MEDICARE

## 2025-06-19 DIAGNOSIS — E78.2 MIXED HYPERLIPIDEMIA: ICD-10-CM

## 2025-06-19 DIAGNOSIS — I10 ESSENTIAL (PRIMARY) HYPERTENSION: ICD-10-CM

## 2025-06-19 DIAGNOSIS — R73.9 HYPERGLYCEMIA: ICD-10-CM

## 2025-06-19 LAB
ABSOLUTE EOSINOPHIL (OHS): 0.1 K/UL
ABSOLUTE MONOCYTE (OHS): 0.51 K/UL (ref 0.3–1)
ABSOLUTE NEUTROPHIL COUNT (OHS): 1.56 K/UL (ref 1.8–7.7)
ALBUMIN SERPL BCP-MCNC: 3.7 G/DL (ref 3.5–5.2)
ALP SERPL-CCNC: 83 UNIT/L (ref 40–150)
ALT SERPL W/O P-5'-P-CCNC: 30 UNIT/L (ref 10–44)
ANION GAP (OHS): 8 MMOL/L (ref 8–16)
AST SERPL-CCNC: 18 UNIT/L (ref 11–45)
BASOPHILS # BLD AUTO: 0.03 K/UL
BASOPHILS NFR BLD AUTO: 0.8 %
BILIRUB SERPL-MCNC: 0.5 MG/DL (ref 0.1–1)
BUN SERPL-MCNC: 14 MG/DL (ref 8–23)
CALCIUM SERPL-MCNC: 9.5 MG/DL (ref 8.7–10.5)
CHLORIDE SERPL-SCNC: 107 MMOL/L (ref 95–110)
CHOLEST SERPL-MCNC: 204 MG/DL (ref 120–199)
CHOLEST/HDLC SERPL: 4 {RATIO} (ref 2–5)
CO2 SERPL-SCNC: 24 MMOL/L (ref 23–29)
CREAT SERPL-MCNC: 0.8 MG/DL (ref 0.5–1.4)
EAG (OHS): 128 MG/DL (ref 68–131)
ERYTHROCYTE [DISTWIDTH] IN BLOOD BY AUTOMATED COUNT: 12.2 % (ref 11.5–14.5)
GFR SERPLBLD CREATININE-BSD FMLA CKD-EPI: >60 ML/MIN/1.73/M2
GLUCOSE SERPL-MCNC: 109 MG/DL (ref 70–110)
HBA1C MFR BLD: 6.1 % (ref 4–5.6)
HCT VFR BLD AUTO: 39.3 % (ref 37–48.5)
HDLC SERPL-MCNC: 51 MG/DL (ref 40–75)
HDLC SERPL: 25 % (ref 20–50)
HGB BLD-MCNC: 13.1 GM/DL (ref 12–16)
IMM GRANULOCYTES # BLD AUTO: 0.01 K/UL (ref 0–0.04)
IMM GRANULOCYTES NFR BLD AUTO: 0.3 % (ref 0–0.5)
LDLC SERPL CALC-MCNC: 120.4 MG/DL (ref 63–159)
LYMPHOCYTES # BLD AUTO: 1.67 K/UL (ref 1–4.8)
MCH RBC QN AUTO: 30.1 PG (ref 27–31)
MCHC RBC AUTO-ENTMCNC: 33.3 G/DL (ref 32–36)
MCV RBC AUTO: 90 FL (ref 82–98)
NONHDLC SERPL-MCNC: 153 MG/DL
NUCLEATED RBC (/100WBC) (OHS): 0 /100 WBC
PLATELET # BLD AUTO: 207 K/UL (ref 150–450)
PMV BLD AUTO: 9.5 FL (ref 9.2–12.9)
POTASSIUM SERPL-SCNC: 4 MMOL/L (ref 3.5–5.1)
PROT SERPL-MCNC: 7.2 GM/DL (ref 6–8.4)
RBC # BLD AUTO: 4.35 M/UL (ref 4–5.4)
RELATIVE EOSINOPHIL (OHS): 2.6 %
RELATIVE LYMPHOCYTE (OHS): 43 % (ref 18–48)
RELATIVE MONOCYTE (OHS): 13.1 % (ref 4–15)
RELATIVE NEUTROPHIL (OHS): 40.2 % (ref 38–73)
SODIUM SERPL-SCNC: 139 MMOL/L (ref 136–145)
TRIGL SERPL-MCNC: 163 MG/DL (ref 30–150)
WBC # BLD AUTO: 3.88 K/UL (ref 3.9–12.7)

## 2025-06-19 PROCEDURE — 80061 LIPID PANEL: CPT

## 2025-06-19 PROCEDURE — 36415 COLL VENOUS BLD VENIPUNCTURE: CPT

## 2025-06-19 PROCEDURE — 85025 COMPLETE CBC W/AUTO DIFF WBC: CPT

## 2025-06-19 PROCEDURE — 82947 ASSAY GLUCOSE BLOOD QUANT: CPT

## 2025-06-19 PROCEDURE — 83036 HEMOGLOBIN GLYCOSYLATED A1C: CPT

## (undated) DEVICE — IRRIGATOR ENDOSCOPY DISP.

## (undated) DEVICE — TROCAR ENDOPATH XCEL 5X75MM

## (undated) DEVICE — SUT MCRYL PLUS 4-0 PS2 27IN

## (undated) DEVICE — SUT 0 VICRYL / UR6 (J603)

## (undated) DEVICE — MANIFOLD 4 PORT

## (undated) DEVICE — TROCAR ENDOPATH XCEL 11MM 10CM

## (undated) DEVICE — ADHESIVE DERMABOND ADVANCED

## (undated) DEVICE — TROCAR ENDOPATH XCEL 5MM 7.5CM

## (undated) DEVICE — SOL IRR NACL .9% 3000ML

## (undated) DEVICE — CONTAINER MULTIPURPOSE/SPECIME

## (undated) DEVICE — SCISSOR 5MMX35CM DIRECT DRIVE

## (undated) DEVICE — GLOVE SURGICAL LATEX SZ 7

## (undated) DEVICE — Device

## (undated) DEVICE — APPLIER CLIP ENDO MED/LG 10MM

## (undated) DEVICE — SYR 30CC LUER LOCK

## (undated) DEVICE — NDL HYPO REG 25G X 1 1/2

## (undated) DEVICE — ELECTRODE REM PLYHSV RETURN 9